# Patient Record
Sex: MALE | Race: BLACK OR AFRICAN AMERICAN | NOT HISPANIC OR LATINO | Employment: UNEMPLOYED | URBAN - METROPOLITAN AREA
[De-identification: names, ages, dates, MRNs, and addresses within clinical notes are randomized per-mention and may not be internally consistent; named-entity substitution may affect disease eponyms.]

---

## 2018-06-27 ENCOUNTER — HOSPITAL ENCOUNTER (EMERGENCY)
Facility: CLINIC | Age: 33
Discharge: HOME | End: 2018-06-27
Attending: EMERGENCY MEDICINE | Admitting: EMERGENCY MEDICINE
Payer: COMMERCIAL

## 2018-06-27 VITALS
SYSTOLIC BLOOD PRESSURE: 112 MMHG | OXYGEN SATURATION: 98 % | HEART RATE: 90 BPM | TEMPERATURE: 98.4 F | DIASTOLIC BLOOD PRESSURE: 53 MMHG

## 2018-06-27 DIAGNOSIS — F10.920 ALCOHOLIC INTOXICATION WITHOUT COMPLICATION (H): ICD-10-CM

## 2018-06-27 LAB — ALCOHOL BREATH TEST: 0.12 (ref 0–0.01)

## 2018-06-27 PROCEDURE — 99282 EMERGENCY DEPT VISIT SF MDM: CPT

## 2018-06-27 NOTE — ED PROVIDER NOTES
"  History     Chief Complaint:  Alcohol intoxication    HPI   Olivia Simmons is a 33 year old male who presents with alcohol intoxication. Per nursing report, the patient wandered into [facility] department this evening and appeared highly intoxication with slurred speech and uncoordinated balance. The police placed him on a hold and brought him here for evaluation. Here, the patient notes he went to the police station because he \"wanted to go home to [facility].\" He also reports that he is homeless and endorses using marijuana. Per chart review, the patient has been seen multiple times recently at [facility] for alcohol intoxication. He denies suicidal ideas, homicidal ideas, or any additional complaints here.     Allergies:  No known drug allergies     Medications:    The patient is currently on no regular medications.     Past Medical History:    The patient does not have any past pertinent medical history.     Past Surgical History:    History reviewed. No pertinent surgical history.     Family History:    History reviewed. No pertinent family history.      Social History:  Smoking status: Everyday smoker  Alcohol use: Yes    Marital Status:  Single      Review of Systems   Unable to perform ROS: Mental status change   Psychiatric/Behavioral: Negative for suicidal ideas.     Physical Exam     Patient Vitals for the past 24 hrs:   BP Temp Temp src Heart Rate SpO2   06/27/18 0342 112/53 98.4  F (36.9  C) Oral 101 98 %   06/27/18 0314 - - - - 98 %   06/27/18 0313 - - - - 97 %   06/27/18 0312 102/67 - - - -      Physical Exam  Constitutional: Intoxicated   HENT:   Head: Atraumatic  Right Ear: Normal  Left Ear: Normal  Nose: Nose normal.   Mouth/Throat: Oropharynx is clear and moist. No erythema or exudate.   Eyes: Conjunctivae and EOM are normal. Pupils are equal, round, and reactive to light. No discharge  Neck: Normal range of motion. Neck supple.   Cardiovascular: Normal rate, regular rhythm, no murmur gallops or rubs. " Intact distal pulses.    Pulmonary/Chest: CTA bilaterally. No wheezes rale or rhonchi.  Abdominal: Soft. Non tender.  No masses   Musculoskeletal: No edema. No bony deformity. Normal range of motion  Lymphadenopathy:     The patient has no cervical adenopathy.   Neurological: The patient is alert and oriented to person, place, and time. The patient has normal strength and normal reflexes. No cranial nerve deficit. Coordination normal.   Skin: Skin is warm and dry. No rash noted. The patient is not diaphoretic.   Psychiatric: Intoxicated.     Emergency Department Course     Laboratory:  0317 Alcohol breath test: 0.117(A)     Emergency Department Course:  Past medical records, nursing notes, and vitals reviewed.  0316: I performed an exam of the patient and obtained history, as documented above.   Labs obtained as above.   0430: I rechecked the patient. Findings and plan explained to the Patient. Patient discharged home with instructions regarding supportive care, medications, and reasons to return. The importance of close follow-up was reviewed.     Patient was signed out to my partner Dr. Ceballos.      Impression & Plan      Medical Decision Making:  Olivia Simmons is a 33 year old male who presents with alcohol intoxication. He appearantly wandered in to the [place] [employer], appearing unsteady on his feet and was brought here for further evaluation. Patient denies any suicidal or homicidal ideation. He does tell the nursing staff that he is homeless and does want to return to [facility]. Breathalyzer alcohol was 0.117. Patient will be allowed to sober in the ED and can be discharged when the buses start running at 0500 or 0600 AM.     Diagnosis:    ICD-10-CM    1. Alcoholic intoxication without complication (H) F10.920        Disposition:  discharged to home    [facility] EMERGENCY DEPARTMENT    Ace MANDEL am serving as a scribe at 3:11 AM on 6/27/2018 to document services personally performed by Levon Lobo  MD [facility] based on my observations and the provider's statements to me.       Levon Lobo MD  06/27/18 4650

## 2018-06-27 NOTE — ED TRIAGE NOTES
Pt is pleasantly intoxicated.  He is happy and smiling asking to go back to [place].  He changed into the ED scrubs without assistance. MD at bedside.

## 2018-06-27 NOTE — ED AVS SNAPSHOT
Emergency Department    [address] [place] [address]    Phone:  [phone]    Fax:  [phone]                                       Olivia Simmons   MRN: [id number]    Department:   Emergency Department   Date of Visit:  6/27/2018           Patient Information     Date Of Birth          1985        Your diagnoses for this visit were:     Alcoholic intoxication without complication (H)        You were seen by Levon Lobo MD.      Follow-up Information     Follow up with your doctor.        Discharge Instructions         Alcohol Intoxication  Alcohol intoxication occurs when you drink alcohol faster than your liver can remove it from your system. The following facts are important to remember:    It can take 10 minutes or more to start to feel the effects of a drink, so you can easily get more intoxicated than you intended.    One drink may be more than 1 serving of alcohol. Depending on the drink, it can be 2 to 4 servings.    It takes about an hour for your body to metabolize (clear) 1 serving. If you have more than 1 drink, it can take a couple of hours or more.    Many things affect how drinks will affect you, including whether you ve eaten, how fast you drink, your size, how much you normally drink (or not), medicines you take, chronic diseases you have, and gender.  Signs and symptoms of alcohol poisoning  The following are signs and symptoms of alcohol poisoning:  Mild impairment    Reduced inhibitions    Slurred speech    Drowsiness    Decreased fine motor skills  Moderate impairment    Erratic behavior, aggression, depression    Impaired judgment    Confusion    Concentration difficulties    Coordination problems  Severe impairment    Vomiting    Seizures    Unconsciousness    Cold, clammy    Slow or irregular breathing    Hypothermia (low body temperature)    Coma  Health effects  Alcohol abuse causes health problems. Sometimes this can happen after only drinking a  " little.\" There is no set number of drinks or amount of alcohol that defines too much. The more you drink at one time, and the more frequently you drink determine both the short-term and long-term health effects. It affects all parts of your body and your health, including your:    Brain. Alcohol is a central nervous system depressant. It can damage parts of the brain that affect your balance, memory, thinking, and emotions. It can cause memory loss, blackouts, depression, agitation, sleep cycle changes, and seizures. These changes may or may not be reversible.    Heart and vascular system. Alcohol affects multiple areas. It can damage heart muscle causing cardiomyopathy, which is a weakening and stretching of the heart muscle. This can lead to trouble breathing, an irregular heartbeat, atrial fibrillation, leg swelling, and heart failure. It makes the blood vessels stiffen causing hypertension (high blood pressure). All of these problems increase your risk of having heart attacks or strokes.    Liver. Alcohol causes fat to build up in the liver, affecting its normal function. This increases the risk for hepatitis, leading to abdominal pain, appetite loss, jaundice, bleeding problems, liver fibrosis, and cirrhosis. This in turn can affect your ability to fight off infections, and can cause diabetes. The liver changes prevent it from removing toxins in your blood that can cause encephalopathy. Signs of this are confusion, altered level of consciousness, personality changes, memory loss, seizures, coma, and death.    Pancreas. Alcohol can cause inflammation of the pancreas, or pancreatitis. This can cause pain in your abdomen, fever, and diabetes.    Immune system. Alcohol weakens your immune system in a number of ways. It suppresses your immune system making it harder to fight off infections and colds. You will also have a higher risk of certain infections like pneumonia and tuberculosis.    Cancer risk. Alcohol " raises your risk of cancer of the mouth, esophagus, pharynx, larynx, liver, and breast.    Sexual function. Alcohol abuse can also lead to sexual problems.  Alcohol use during pregnancy may cause permanent damage to the growing baby.  Home care  The following guidelines will help you care for yourself at home:    Don't drink any more alcohol.    Don't drive until all effects of the alcohol have worn off.    Don't operate machinery that can cause injuries.    Get lots of rest over the next few days. Drink plenty of water and other non-alcoholic liquids. Try to eat regular meals.    If you have been drinking heavily on a daily basis, you may go through alcohol withdrawal. The usual symptoms last 3 to 4 days and may include nervousness, shakiness, nausea, sweating, sleeplessness, and can even cause seizures and a serious withdrawal symptom called delirium tremens, or DTs. During this time, it is best that you stay with family or friends who can help and support you. You can also admit yourself to a residential detox program. If your symptoms are severe (seizures, severe shakiness, confusion), contact your doctor or call an ambulance for help (see below).   Follow-up care  If alcohol is a problem in your life, these are some organizations that can help you:    Alcoholics Anonymous offers support through a self-help fellowship. There are no dues or fees. See the Yellow Pages and call for time and place of meetings. Find AA online at www.aa.org.    [employer] offers support to families of alcohol users. Contact 696-951-7410, or online at www.al-anoyadira.org.    National [place] on Alcoholism and Drug Dependence can be reached at 423-859-3151, or online at www.ncadd.org.    There are also inpatient and residential alcohol detox programs. Check the Internet or phonebook Yellow Pages under  Drug Abuse and Treatment Centers.   Call 911  Call 911 if any of these occur:    Trouble breathing or slow irregular breathing    Chest  pain    Sudden weakness on one side of your body or sudden trouble speaking    Heavy bleeding or vomiting blood    Very drowsy or trouble awakening    Fainting or loss of consciousness    Rapid heart rate    Seizure  When to seek medical advice  Call your healthcare provider right away if any of these occur:    Severe shakiness     Fever of 100.4 F (38 C) or higher, or as directed by your healthcare provider    Confusion or hallucinations (seeing, hearing, or feeling things that are not there)    Pain in your upper abdomen that gets worse    Repeated vomiting  Date Last Reviewed: 6/1/2016 2000-2017 [employer]. [address] [place] [address]. All rights reserved. This information is not intended as a substitute for professional medical care. Always follow your healthcare professional's instructions.          24 Hour Appointment Hotline       To make an appointment at any [facility], call [phone] ([phone]). If you don't have a family doctor or clinic, we will help you find one. [facility] clinics are conveniently located to serve the needs of you and your family.             Review of your medicines      Notice     You have not been prescribed any medications.            Procedures and tests performed during your visit     Alcohol breath test POCT      Orders Needing Specimen Collection     None      Pending Results     No orders found from 6/25/2018 to 6/28/2018.            Pending Culture Results     No orders found from 6/25/2018 to 6/28/2018.            Pending Results Instructions     If you had any lab results that were not finalized at the time of your Discharge, you can call the ED Lab Result RN at [phone]. You will be contacted by this team for any positive Lab results or changes in treatment. The nurses are available 7 days a week from 10A to 6:30P.  You can leave a message 24 hours per day and they will return your call.        Test Results From Your  Hospital Stay        6/27/2018  3:33 AM      Component Results     Component Value Ref Range & Units Status    Alcohol Breath Test 0.117 (A) 0.00 - 0.01 Corrected    difficult for pt to complete the test.                 Clinical Quality Measure: Blood Pressure Screening     Your blood pressure was checked while you were in the emergency department today. The last reading we obtained was  BP: 112/53 . Please read the guidelines below about what these numbers mean and what you should do about them.  If your systolic blood pressure (the top number) is less than 120 and your diastolic blood pressure (the bottom number) is less than 80, then your blood pressure is normal. There is nothing more that you need to do about it.  If your systolic blood pressure (the top number) is 120-139 or your diastolic blood pressure (the bottom number) is 80-89, your blood pressure may be higher than it should be. You should have your blood pressure rechecked within a year by a primary care provider.  If your systolic blood pressure (the top number) is 140 or greater or your diastolic blood pressure (the bottom number) is 90 or greater, you may have high blood pressure. High blood pressure is treatable, but if left untreated over time it can put you at risk for heart attack, stroke, or kidney failure. You should have your blood pressure rechecked by a primary care provider within the next 4 weeks.  If your provider in the emergency department today gave you specific instructions to follow-up with your doctor or provider even sooner than that, you should follow that instruction and not wait for up to 4 weeks for your follow-up visit.        Thank you for choosing [facility]       Thank you for choosing [facility] for your care. Our goal is always to provide you with excellent care. Hearing back from our patients is one way we can continue to improve our services. Please take a few minutes to complete the written survey that you may receive in  "the mail after you visit with us. Thank you!        [employer] Information     [email or web address] lets you send messages to your doctor, view your test results, renew your prescriptions, schedule appointments and more. To sign up, go to [email or web address] . Click on \"Log in\" on the left side of the screen, which will take you to the Welcome page. Then click on \"Sign up Now\" on the right side of the page.     You will be asked to enter the access code listed below, as well as some personal information. Please follow the directions to create your username and password.     Your access code is: [email or web address]  Expires: 2018  5:55 AM     Your access code will  in 90 days. If you need help or a new code, please call your [facility] clinic or [phone].        Care EveryWhere ID     This is your Care EveryWhere ID. This could be used by other organizations to access your [facility] medical records  [id number]        Equal Access to Services     ARTI CHERRY : Hadii aad ku hadasho Sosamson, waaxda luqadaha, qaybta kaalmada adekevinyatamia, daniela stroud . So [facility] [phone].    ATENCIÓN: Si habla español, tiene a steven disposición servicios gratuitos de asistencia lingüística. Llame al [phone].    We comply with applicable federal civil rights laws and [place] laws. We do not discriminate on the basis of race, color, national origin, age, disability, sex, sexual orientation, or gender identity.            After Visit Summary       This is your record. Keep this with you and show to your community pharmacist(s) and doctor(s) at your next visit.                  "

## 2018-06-27 NOTE — ED AVS SNAPSHOT
Emergency Department    [address] [place] [address]    Phone:  [phone]    Fax:  [phone]                                       Olivia Simmons   MRN: [id number]    Department:   Emergency Department   Date of Visit:  6/27/2018           After Visit Summary Signature Page     I have received my discharge instructions, and my questions have been answered. I have discussed any challenges I see with this plan with the nurse or doctor.    ..........................................................................................................................................  Patient/Patient Representative Signature      ..........................................................................................................................................  Patient Representative Print Name and Relationship to Patient    ..................................................               ................................................  Date                                            Time    ..........................................................................................................................................  Reviewed by Signature/Title    ...................................................              ..............................................  Date                                                            Time

## 2018-06-27 NOTE — DISCHARGE INSTRUCTIONS
"  Alcohol Intoxication  Alcohol intoxication occurs when you drink alcohol faster than your liver can remove it from your system. The following facts are important to remember:    It can take 10 minutes or more to start to feel the effects of a drink, so you can easily get more intoxicated than you intended.    One drink may be more than 1 serving of alcohol. Depending on the drink, it can be 2 to 4 servings.    It takes about an hour for your body to metabolize (clear) 1 serving. If you have more than 1 drink, it can take a couple of hours or more.    Many things affect how drinks will affect you, including whether you ve eaten, how fast you drink, your size, how much you normally drink (or not), medicines you take, chronic diseases you have, and gender.  Signs and symptoms of alcohol poisoning  The following are signs and symptoms of alcohol poisoning:  Mild impairment    Reduced inhibitions    Slurred speech    Drowsiness    Decreased fine motor skills  Moderate impairment    Erratic behavior, aggression, depression    Impaired judgment    Confusion    Concentration difficulties    Coordination problems  Severe impairment    Vomiting    Seizures    Unconsciousness    Cold, clammy    Slow or irregular breathing    Hypothermia (low body temperature)    Coma  Health effects  Alcohol abuse causes health problems. Sometimes this can happen after only drinking a  little.\" There is no set number of drinks or amount of alcohol that defines too much. The more you drink at one time, and the more frequently you drink determine both the short-term and long-term health effects. It affects all parts of your body and your health, including your:    Brain. Alcohol is a central nervous system depressant. It can damage parts of the brain that affect your balance, memory, thinking, and emotions. It can cause memory loss, blackouts, depression, agitation, sleep cycle changes, and seizures. These changes may or may not be " reversible.    Heart and vascular system. Alcohol affects multiple areas. It can damage heart muscle causing cardiomyopathy, which is a weakening and stretching of the heart muscle. This can lead to trouble breathing, an irregular heartbeat, atrial fibrillation, leg swelling, and heart failure. It makes the blood vessels stiffen causing hypertension (high blood pressure). All of these problems increase your risk of having heart attacks or strokes.    Liver. Alcohol causes fat to build up in the liver, affecting its normal function. This increases the risk for hepatitis, leading to abdominal pain, appetite loss, jaundice, bleeding problems, liver fibrosis, and cirrhosis. This in turn can affect your ability to fight off infections, and can cause diabetes. The liver changes prevent it from removing toxins in your blood that can cause encephalopathy. Signs of this are confusion, altered level of consciousness, personality changes, memory loss, seizures, coma, and death.    Pancreas. Alcohol can cause inflammation of the pancreas, or pancreatitis. This can cause pain in your abdomen, fever, and diabetes.    Immune system. Alcohol weakens your immune system in a number of ways. It suppresses your immune system making it harder to fight off infections and colds. You will also have a higher risk of certain infections like pneumonia and tuberculosis.    Cancer risk. Alcohol raises your risk of cancer of the mouth, esophagus, pharynx, larynx, liver, and breast.    Sexual function. Alcohol abuse can also lead to sexual problems.  Alcohol use during pregnancy may cause permanent damage to the growing baby.  Home care  The following guidelines will help you care for yourself at home:    Don't drink any more alcohol.    Don't drive until all effects of the alcohol have worn off.    Don't operate machinery that can cause injuries.    Get lots of rest over the next few days. Drink plenty of water and other non-alcoholic liquids.  Try to eat regular meals.    If you have been drinking heavily on a daily basis, you may go through alcohol withdrawal. The usual symptoms last 3 to 4 days and may include nervousness, shakiness, nausea, sweating, sleeplessness, and can even cause seizures and a serious withdrawal symptom called delirium tremens, or DTs. During this time, it is best that you stay with family or friends who can help and support you. You can also admit yourself to a residential detox program. If your symptoms are severe (seizures, severe shakiness, confusion), contact your doctor or call an ambulance for help (see below).   Follow-up care  If alcohol is a problem in your life, these are some organizations that can help you:    Alcoholics Anonymous offers support through a self-help fellowship. There are no dues or fees. See the Yellow Pages and call for time and place of meetings. Find AA online at www.aa.org.    [employer] offers support to families of alcohol users. Contact 718-202-1460, or online at www.al-anoyadira.org.    National [place] on Alcoholism and Drug Dependence can be reached at 802-175-5230, or online at www.ncadd.org.    There are also inpatient and residential alcohol detox programs. Check the Internet or phonebook Yellow Pages under  Drug Abuse and Treatment Centers.   Call 911  Call 911 if any of these occur:    Trouble breathing or slow irregular breathing    Chest pain    Sudden weakness on one side of your body or sudden trouble speaking    Heavy bleeding or vomiting blood    Very drowsy or trouble awakening    Fainting or loss of consciousness    Rapid heart rate    Seizure  When to seek medical advice  Call your healthcare provider right away if any of these occur:    Severe shakiness     Fever of 100.4 F (38 C) or higher, or as directed by your healthcare provider    Confusion or hallucinations (seeing, hearing, or feeling things that are not there)    Pain in your upper abdomen that gets worse    Repeated  vomiting  Date Last Reviewed: 6/1/2016 2000-2017 [employer]. [address] [place] [address], [place] [address]. All rights reserved. This information is not intended as a substitute for professional medical care. Always follow your healthcare professional's instructions.

## 2018-06-27 NOTE — ED NOTES
"Hold placed by [place] [employer] reads:  \"Olivia voluntarily showed up a police department highly intoxicated.  Olivia has slurred speech, uncoordinated balance, and CHANTAL of 0.12 on a weak sample.  At this time Olivia cannot care for himself.\"   "

## 2018-10-03 ENCOUNTER — OFFICE VISIT (OUTPATIENT)
Dept: URGENT CARE | Facility: URGENT CARE | Age: 33
End: 2018-10-03
Payer: COMMERCIAL

## 2018-10-03 ENCOUNTER — RADIANT APPOINTMENT (OUTPATIENT)
Dept: GENERAL RADIOLOGY | Facility: CLINIC | Age: 33
End: 2018-10-03
Attending: PHYSICIAN ASSISTANT
Payer: COMMERCIAL

## 2018-10-03 VITALS
OXYGEN SATURATION: 98 % | WEIGHT: 163.1 LBS | SYSTOLIC BLOOD PRESSURE: 118 MMHG | DIASTOLIC BLOOD PRESSURE: 90 MMHG | RESPIRATION RATE: 16 BRPM | HEART RATE: 67 BPM

## 2018-10-03 DIAGNOSIS — S89.92XA KNEE INJURY, LEFT, INITIAL ENCOUNTER: ICD-10-CM

## 2018-10-03 DIAGNOSIS — S89.92XA KNEE INJURY, LEFT, INITIAL ENCOUNTER: Primary | ICD-10-CM

## 2018-10-03 PROCEDURE — 99213 OFFICE O/P EST LOW 20 MIN: CPT | Performed by: PHYSICIAN ASSISTANT

## 2018-10-03 PROCEDURE — 73562 X-RAY EXAM OF KNEE 3: CPT | Mod: LT

## 2018-10-03 RX ORDER — ACETAMINOPHEN 500 MG
1000 TABLET ORAL EVERY 6 HOURS PRN
Qty: 100 TABLET | Refills: 0 | Status: SHIPPED | OUTPATIENT
Start: 2018-10-03

## 2018-10-03 NOTE — MR AVS SNAPSHOT
After Visit Summary   10/3/2018    Olivia Simmons    MRN: [id number]           Patient Information     Date Of Birth          1985        Visit Information        Provider Department      10/3/2018 9:50 AM Ingrid Christianson PA-C [facility]        Today's Diagnoses     Knee injury, left, initial encounter    -  1      Care Instructions    (S89.92XA) Knee injury, left, initial encounter  (primary encounter diagnosis)  Comment:   Plan: XR Knee Left 3 Views, acetaminophen (TYLENOL)         500 MG tablet, order for DME, order for DME,         ORTHOPEDICS ADULT REFERRAL          Follow up with orthopedics within the next 3-5 days for further evaluation.  Knee immobilizer and crutches until follow up with Orthopedic clinic.  Tylenol as needed for pain.              Follow-ups after your visit        Additional Services     ORTHOPEDICS ADULT REFERRAL       Your provider has referred you to: [facility] [place] [phone]   [email or web address]    Please be aware that coverage of these services is subject to the terms and limitations of your health insurance plan.  Call member services at your health plan with any benefit or coverage questions.      Please bring the following to your appointment:    >>   Any x-rays, CTs or MRIs which have been performed.  Contact the facility where they were done to arrange for  prior to your scheduled appointment.    >>   List of current medications   >>   This referral request   >>   Any documents/labs given to you for this referral                  Who to contact     If you have questions or need follow up information about today's clinic visit or your schedule please contact [facility] directly at [phone].  Normal or non-critical lab and imaging results will be communicated to you by MyChart, letter or phone within 4 business days after the clinic has  "received the results. If you do not hear from us within 7 days, please contact the clinic through Actimo or phone. If you have a critical or abnormal lab result, we will notify you by phone as soon as possible.  Submit refill requests through Actimo or call your pharmacy and they will forward the refill request to us. Please allow 3 business days for your refill to be completed.          Additional Information About Your Visit        Actimo Information     Actimo lets you send messages to your doctor, view your test results, renew your prescriptions, schedule appointments and more. To sign up, go to [email or web address] . Click on \"Log in\" on the left side of the screen, which will take you to the Welcome page. Then click on \"Sign up Now\" on the right side of the page.     You will be asked to enter the access code listed below, as well as some personal information. Please follow the directions to create your username and password.     Your access code is: [email or web address]  Expires: 2019 12:03 PM     Your access code will  in 90 days. If you need help or a new code, please call your [place] clinic or [phone].        Care EveryWhere ID     This is your Care EveryWhere ID. This could be used by other organizations to access your [place] medical records  [id number]        Your Vitals Were     Pulse Respirations Pulse Oximetry             67 16 98%          Blood Pressure from Last 3 Encounters:   10/03/18 118/90   18 112/53    Weight from Last 3 Encounters:   10/03/18 163 lb 1.6 oz (74 kg)              We Performed the Following     ORTHOPEDICS ADULT REFERRAL          Today's Medication Changes          These changes are accurate as of 10/3/18 12:03 PM.  If you have any questions, ask your nurse or doctor.               Start taking these medicines.        Dose/Directions    acetaminophen 500 MG tablet   Commonly known as:  TYLENOL   Used for:  Knee injury, left, initial encounter   Started " by:  Ingrid Christianson PA-C        Dose:  1000 mg   Take 2 tablets (1,000 mg) by mouth every 6 hours as needed for mild pain   Quantity:  100 tablet   Refills:  0       order for DME   Used for:  Knee injury, left, initial encounter   Started by:  Ingrid Christianson PA-C        Equipment being ordered: crutches   Quantity:  1 Device   Refills:  0       order for DME   Used for:  Knee injury, left, initial encounter   Started by:  Ingrid Christianson PA-C        Equipment being ordered: knee immobilizer   Quantity:  1 Device   Refills:  0            Where to get your medicines      These medications were sent to [facility] [address] [address]     Phone:  [phone]     acetaminophen 500 MG tablet         Some of these will need a paper prescription and others can be bought over the counter.  Ask your nurse if you have questions.     Bring a paper prescription for each of these medications     order for DME    order for DME                Primary Care Provider Fax #    Physician No Ref-Primary [phone]       No address on file        Equal Access to Services     [facility]: Hadjg infante Sosamson, waaxda lumichel, qaybta kaalyenny aburto, daniela stroud . So [facility] [phone].    ATENCIÓN: Si habla español, tiene a steven disposición servicios gratuitos de asistencia lingüística. Armin al [phone].    We comply with applicable federal civil rights laws and [place] laws. We do not discriminate on the basis of race, color, national origin, age, disability, sex, sexual orientation, or gender identity.            Thank you!     Thank you for choosing [facility]  for your care. Our goal is always to provide you with excellent care. Hearing back from our patients is one way we can continue to improve our services. Please take a few minutes to complete the  written survey that you may receive in the mail after your visit with us. Thank you!             Your Updated Medication List - Protect others around you: Learn how to safely use, store and throw away your medicines at www.disposemymeds.org.          This list is accurate as of 10/3/18 12:03 PM.  Always use your most recent med list.                   Brand Name Dispense Instructions for use Diagnosis    acetaminophen 500 MG tablet    TYLENOL    100 tablet    Take 2 tablets (1,000 mg) by mouth every 6 hours as needed for mild pain    Knee injury, left, initial encounter       order for DME     1 Device    Equipment being ordered: crutches    Knee injury, left, initial encounter       order for DME     1 Device    Equipment being ordered: knee immobilizer    Knee injury, left, initial encounter

## 2018-10-03 NOTE — PATIENT INSTRUCTIONS
(S89.92XA) Knee injury, left, initial encounter  (primary encounter diagnosis)  Comment:   Plan: XR Knee Left 3 Views, acetaminophen (TYLENOL)         500 MG tablet, order for DME, order for DME,         ORTHOPEDICS ADULT REFERRAL          Follow up with orthopedics within the next 3-5 days for further evaluation.  Knee immobilizer and crutches until follow up with Orthopedic clinic.  Tylenol as needed for pain.

## 2018-10-03 NOTE — PROGRESS NOTES
SUBJECTIVE:  Chief Complaint   Patient presents with     Urgent Care     Pt states left knee twisted, swollen, pain  sxs 1x weeks      Olivia Simmons is a 33 year old male presents with a chief complaint of left knee pain since he twisted it a week ago while playing basketball.    Denies falling.    Denies any other trauma.     He is currently in detox and is here with his caregiver/attendant.      No past medical history on file.   Alcohol abuse    There is no problem list on file for this patient.    Social History   Substance Use Topics     Smoking status: Current Every Day Smoker     Smokeless tobacco: Never Used     Alcohol use Not on file       ROS:  CONSTITUTIONAL:NEGATIVE for fever, chills, change in weight  INTEGUMENTARY/SKIN: NEGATIVE for worrisome rashes, moles or lesions  ENT/MOUTH: NEGATIVE for ear, mouth and throat problems  RESP:NEGATIVE for significant cough or SOB  CV: NEGATIVE for chest pain, palpitations or peripheral edema  MUSCULOSKELETAL: as per HPI  NEURO: NEGATIVE for weakness, dizziness or paresthesias  Review of systems negative except as stated above.    EXAM:   /90  Pulse 67  Resp 16  Wt 163 lb 1.6 oz (74 kg)  SpO2 98%  Gen: healthy,alert,no distress  Extremity: left knee with joint line tenderness at medial aspect.  NO swelling.  No erythema.  Patella moves well without crepitus.    Joint is stable.     There is not compromise to the distal circulation.  GENERAL APPEARANCE: healthy, alert and no distress  SKIN: no suspicious lesions or rashes  NEURO: Normal strength and tone, sensory exam grossly normal, mentation intact and speech normal    X-RAY was done.    (S89.92XA) Knee injury, left, initial encounter  (primary encounter diagnosis)  Comment:   Plan: XR Knee Left 3 Views, acetaminophen (TYLENOL)         500 MG tablet, order for DME, order for DME,         ORTHOPEDICS ADULT REFERRAL          Follow up with orthopedics within the next 3-5 days for further evaluation.  Knee  immobilizer and crutches until follow up with Orthopedic clinic.  Tylenol as needed for pain.      Patient expresses understanding and agreement with the assessment and plan as above.

## 2021-08-14 ENCOUNTER — HOSPITAL ENCOUNTER (EMERGENCY)
Facility: CLINIC | Age: 36
Discharge: HOME | End: 2021-08-14
Attending: EMERGENCY MEDICINE | Admitting: EMERGENCY MEDICINE
Payer: COMMERCIAL

## 2021-08-14 VITALS
HEART RATE: 95 BPM | OXYGEN SATURATION: 97 % | TEMPERATURE: 97.3 F | DIASTOLIC BLOOD PRESSURE: 97 MMHG | RESPIRATION RATE: 16 BRPM | SYSTOLIC BLOOD PRESSURE: 140 MMHG

## 2021-08-14 DIAGNOSIS — F10.220 ALCOHOL DEPENDENCE WITH UNCOMPLICATED INTOXICATION (H): ICD-10-CM

## 2021-08-14 DIAGNOSIS — F10.10 ALCOHOL ABUSE: ICD-10-CM

## 2021-08-14 LAB — ALCOHOL BREATH TEST: 0.03 (ref 0–0.01)

## 2021-08-14 PROCEDURE — 99283 EMERGENCY DEPT VISIT LOW MDM: CPT

## 2021-08-14 PROCEDURE — 99282 EMERGENCY DEPT VISIT SF MDM: CPT | Performed by: EMERGENCY MEDICINE

## 2021-08-14 PROCEDURE — 82075 ASSAY OF BREATH ETHANOL: CPT

## 2021-08-14 ASSESSMENT — ENCOUNTER SYMPTOMS
ARTHRALGIAS: 0
FEVER: 0
COLOR CHANGE: 0
SHORTNESS OF BREATH: 0
HEADACHES: 0
NECK STIFFNESS: 0
EYE REDNESS: 0
DIFFICULTY URINATING: 0
ABDOMINAL PAIN: 0
CONFUSION: 0

## 2021-08-14 ASSESSMENT — LIFESTYLE VARIABLES: INTOXICATION: 1

## 2021-08-14 NOTE — ED PROVIDER NOTES
[facility] EMERGENCY DEPARTMENT ([facility])       8/14/21  History     Chief Complaint   Patient presents with     Alcohol Intoxication     Looking for detox drinks liter of vodka day      The history is provided by the patient and medical records.   Alcohol Intoxication  Associated symptoms: no abdominal pain, no confusion, no headaches and no shortness of breath      Olivia Simmons is a 36 year old otherwise healthy homeless male who presents to the Emergency Department for alcohol assessment, requesting admission for detox. Patient drinks one gallon of vodka per day, with his last drink last night. He states that he has been in treatment for detox previously.  He denies history of withdrawal seizures or DTs. When [facility] was suggested patient states that he was there yesterday and they do not accept him.  Upon further review, patient is on a hold by [facility] for 1 month where he cannot show up because he does not follow protocol.  Patient repeatedly leaves prior to discharge.       History reviewed. No pertinent past medical history.    History reviewed. No pertinent surgical history.    History reviewed. No pertinent family history.    Social History     Tobacco Use     Smoking status: Current Every Day Smoker     Smokeless tobacco: Never Used   Substance Use Topics     Alcohol use: Yes     Comment: 1 liter of vodka a day        No current facility-administered medications for this encounter.     Current Outpatient Medications   Medication     acetaminophen (TYLENOL) 500 MG tablet     order for DME     order for DME      No Known Allergies      I have reviewed the Medications, Allergies, Past Medical and Surgical History, and Social History in the Epic system.    Review of Systems   Constitutional: Negative for fever.   HENT: Negative for congestion.    Eyes: Negative for redness.   Respiratory: Negative for shortness of breath.    Cardiovascular: Negative for chest pain.    Gastrointestinal: Negative for abdominal pain.   Genitourinary: Negative for difficulty urinating.   Musculoskeletal: Negative for arthralgias and neck stiffness.   Skin: Negative for color change.   Neurological: Negative for headaches.   Psychiatric/Behavioral: Negative for confusion.   All other systems reviewed and are negative.    A complete review of systems was performed with pertinent positives and negatives noted in the HPI, and all other systems negative.    Physical Exam   BP: (!) 145/85  Pulse: 105  Temp: (!) 95.6  F (35.3  C)  Resp: 14  SpO2: 99 %      Physical Exam  Constitutional:       General: He is not in acute distress.     Appearance: He is not diaphoretic.   HENT:      Head: Atraumatic.   Eyes:      General: No scleral icterus.     Pupils: Pupils are equal, round, and reactive to light.   Cardiovascular:      Heart sounds: Normal heart sounds.   Pulmonary:      Effort: No respiratory distress.      Breath sounds: Normal breath sounds.   Abdominal:      General: Bowel sounds are normal.      Palpations: Abdomen is soft.      Tenderness: There is no abdominal tenderness.   Musculoskeletal:         General: No tenderness.   Skin:     General: Skin is warm.      Findings: No rash.         ED Course   9:47 AM  The patient was seen and examined by Bharath Holliday MD in Room ED12.          Procedures              Results for orders placed or performed during the hospital encounter of 08/14/21 (from the past 24 hour(s))   Alcohol breath test POCT   Result Value Ref Range    Alcohol Breath Test 0.031 (A) 0.00 - 0.01     Medications - No data to display          Assessments & Plan (with Medical Decision Making)   36-year-old male who presents requesting detox from alcohol.  Differential includes alcoholism, homelessness, malingering, mental illness.  Exam was grossly unremarkable.  Laboratories revealed alcohol level of 0.031.  Patient was allowed to sleep more than 7 hours in the emergency room.  He was  manifesting no withdrawal symptoms.  He will be discharged with instructions on alcohol avoidance.    I have reviewed the nursing notes.    I have reviewed the findings, diagnosis, plan and need for follow up with the patient.    New Prescriptions    No medications on file       Final diagnoses:   None       I, Mercedes Blancas, am serving as a trained medical scribe to document services personally performed by Bhraath Holliday MD based on the provider's statements to me on August 14, 2021.  This document has been checked and approved by the attending provider.    I, Bharath Holliday MD, was physically present and have reviewed and verified the accuracy of this note documented by Mercedes Blancas, medical scribe.      Bharath Holliday MD  8/14/2021   [facility] EMERGENCY DEPARTMENT     Bharath Holliday MD  08/14/21 8990

## 2021-08-14 NOTE — ED NOTES
Patient states that he has had throat and chest pain due to a cough. I discussed this with Dr. Holliday who is at the bedside speaking with the patient.

## 2021-12-14 ENCOUNTER — HOSPITAL ENCOUNTER (EMERGENCY)
Facility: CLINIC | Age: 36
Discharge: HOME | End: 2021-12-14
Attending: EMERGENCY MEDICINE | Admitting: EMERGENCY MEDICINE
Payer: COMMERCIAL

## 2021-12-14 VITALS
TEMPERATURE: 98.1 F | HEART RATE: 102 BPM | DIASTOLIC BLOOD PRESSURE: 73 MMHG | OXYGEN SATURATION: 98 % | SYSTOLIC BLOOD PRESSURE: 122 MMHG | RESPIRATION RATE: 16 BRPM

## 2021-12-14 DIAGNOSIS — F10.920 ALCOHOLIC INTOXICATION WITHOUT COMPLICATION (H): ICD-10-CM

## 2021-12-14 LAB
ALCOHOL BREATH TEST: 0.06 (ref 0–0.01)
BASOPHILS # BLD AUTO: 0 10E3/UL (ref 0–0.2)
BASOPHILS NFR BLD AUTO: 1 %
EOSINOPHIL # BLD AUTO: 0.2 10E3/UL (ref 0–0.7)
EOSINOPHIL NFR BLD AUTO: 3 %
ERYTHROCYTE [DISTWIDTH] IN BLOOD BY AUTOMATED COUNT: 14 % (ref 10–15)
FLUAV RNA SPEC QL NAA+PROBE: NEGATIVE
FLUBV RNA RESP QL NAA+PROBE: NEGATIVE
HCT VFR BLD AUTO: 43.9 % (ref 40–53)
HGB BLD-MCNC: 15.8 G/DL (ref 13.3–17.7)
IMM GRANULOCYTES # BLD: 0 10E3/UL
IMM GRANULOCYTES NFR BLD: 0 %
LYMPHOCYTES # BLD AUTO: 2.9 10E3/UL (ref 0.8–5.3)
LYMPHOCYTES NFR BLD AUTO: 49 %
MCH RBC QN AUTO: 31 PG (ref 26.5–33)
MCHC RBC AUTO-ENTMCNC: 36 G/DL (ref 31.5–36.5)
MCV RBC AUTO: 86 FL (ref 78–100)
MONOCYTES # BLD AUTO: 0.5 10E3/UL (ref 0–1.3)
MONOCYTES NFR BLD AUTO: 8 %
NEUTROPHILS # BLD AUTO: 2.4 10E3/UL (ref 1.6–8.3)
NEUTROPHILS NFR BLD AUTO: 39 %
NRBC # BLD AUTO: 0 10E3/UL
NRBC BLD AUTO-RTO: 0 /100
PLATELET # BLD AUTO: 241 10E3/UL (ref 150–450)
RBC # BLD AUTO: 5.09 10E6/UL (ref 4.4–5.9)
SARS-COV-2 RNA RESP QL NAA+PROBE: NEGATIVE
WBC # BLD AUTO: 6 10E3/UL (ref 4–11)

## 2021-12-14 PROCEDURE — 250N000011 HC RX IP 250 OP 636: Performed by: EMERGENCY MEDICINE

## 2021-12-14 PROCEDURE — C9803 HOPD COVID-19 SPEC COLLECT: HCPCS

## 2021-12-14 PROCEDURE — 82075 ASSAY OF BREATH ETHANOL: CPT

## 2021-12-14 PROCEDURE — 85025 COMPLETE CBC W/AUTO DIFF WBC: CPT | Performed by: EMERGENCY MEDICINE

## 2021-12-14 PROCEDURE — 99283 EMERGENCY DEPT VISIT LOW MDM: CPT

## 2021-12-14 PROCEDURE — 87636 SARSCOV2 & INF A&B AMP PRB: CPT | Performed by: EMERGENCY MEDICINE

## 2021-12-14 PROCEDURE — 250N000013 HC RX MED GY IP 250 OP 250 PS 637: Performed by: EMERGENCY MEDICINE

## 2021-12-14 PROCEDURE — 36415 COLL VENOUS BLD VENIPUNCTURE: CPT | Performed by: EMERGENCY MEDICINE

## 2021-12-14 RX ORDER — ONDANSETRON 4 MG/1
4 TABLET, ORALLY DISINTEGRATING ORAL ONCE
Status: COMPLETED | OUTPATIENT
Start: 2021-12-14 | End: 2021-12-14

## 2021-12-14 RX ORDER — IBUPROFEN 200 MG
400 TABLET ORAL ONCE
Status: COMPLETED | OUTPATIENT
Start: 2021-12-14 | End: 2021-12-14

## 2021-12-14 RX ADMIN — IBUPROFEN 400 MG: 200 TABLET, FILM COATED ORAL at 09:35

## 2021-12-14 RX ADMIN — ONDANSETRON 4 MG: 4 TABLET, ORALLY DISINTEGRATING ORAL at 10:31

## 2021-12-14 NOTE — ED PROVIDER NOTES
History     Chief Complaint:  Alcohol Intoxication       HPI   Olivia Simmons is a 36 year old male who who presents after being found to intoxicated at hotel.  He tells us he has been drinking all day, denies any trauma.  He tells us that he was told by the place that he had to go to detox.  He is unsure why they brought him here, he has no medical complaints at this time.    Allergies:  No Known Allergies     Medications:    acetaminophen (TYLENOL) 500 MG tablet  order for DME  order for DME        Past Medical History:    No past medical history on file.    There are no problems to display for this patient.       Past Surgical History:    No past surgical history on file.     Family History:    family history is not on file.    Social History:   reports that he has been smoking. He has never used smokeless tobacco. He reports current alcohol use. He reports current drug use. Drug: Marijuana.    PCP: No Ref-Primary, Physician     Review of Systems   All other systems reviewed and are negative.        Physical Exam   Patient Vitals for the past 24 hrs:   BP Temp Temp src Pulse Resp SpO2   12/14/21 0447 117/66 98.3  F (36.8  C) Temporal 98 18 99 %        Physical Exam  Vitals: reviewed by me  General: Pt seen on [facility], pleasant, cooperative, and alert to conversation, quite intoxicated appearing however.  Eyes: Tracking well, clear conjunctiva BL  ENT: MMM, midline trachea.   Lungs: No tachypnea, no accessory muscle use. No respiratory distress.   CV: Rate as above  MSK: no joint effusion.  No evidence of trauma  Skin: No rash  Neuro: Slurred speech and no facial droop.  Psych: Not RIS, no e/o AH/VH    Emergency Department Course       Interventions:  Medications - No data to display     Emergency Department Course:  Past medical records, nursing notes, and vitals reviewed.  I performed an exam of the patient and obtained history, as documented above.    Impression & Plan      Medical Decision  Making:  This is a 36-year-old gentleman presents emergency room with alcohol intoxication.  He seems to be intoxicated, he endorses alcohol intoxication, and this is also the report that police given they dropped him off.  He is resting comfortably here, will allow for metabolization of alcohol, and signed out to oncoming provider.    Diagnosis:    ICD-10-CM    1. Alcoholic intoxication without complication (H)  F10.920         Discharge Medications:  New Prescriptions    No medications on file        12/14/2021   Anish Briceno*        Anish Briceno MD  12/14/21 0552

## 2021-12-14 NOTE — ED PROVIDER NOTES
Olivia Simmons is a 36 year old male who presents to the emergency department with alcohol intoxication.  He was found to be intoxicated at a hotel.  Upon reassessment patient reports that he is feeling improved.  He was given food to eat.    He was ambulated here and was steady.  He was given ibuprofen for headache as well as water to drink.    As he is steady with ambulation and no concerns for mental health concerns, he later requested to be discharged to detox.  There is a bed available at [facility] and he stated that he would be willing to stay there for 3 days.  He did note that he had one episode of hematemesis while here.  He is not on any blood thinners.  Suspect is related to gastritis and drinking.  Hemoglobin was checked and was found to be normal.  He is given a prescription for omeprazole.     Prieto Roa MD  12/14/21 2949

## 2022-06-25 ENCOUNTER — HOSPITAL ENCOUNTER (EMERGENCY)
Facility: CLINIC | Age: 37
Discharge: HOME | End: 2022-06-25
Attending: EMERGENCY MEDICINE | Admitting: EMERGENCY MEDICINE
Payer: COMMERCIAL

## 2022-06-25 VITALS
RESPIRATION RATE: 18 BRPM | TEMPERATURE: 98.3 F | HEART RATE: 80 BPM | WEIGHT: 164 LBS | HEIGHT: 71 IN | BODY MASS INDEX: 22.96 KG/M2 | OXYGEN SATURATION: 98 %

## 2022-06-25 DIAGNOSIS — F19.10 DRUG ABUSE (H): ICD-10-CM

## 2022-06-25 DIAGNOSIS — U07.1 INFECTION DUE TO 2019 NOVEL CORONAVIRUS: ICD-10-CM

## 2022-06-25 LAB — SARS-COV-2 RNA RESP QL NAA+PROBE: POSITIVE

## 2022-06-25 PROCEDURE — U0003 INFECTIOUS AGENT DETECTION BY NUCLEIC ACID (DNA OR RNA); SEVERE ACUTE RESPIRATORY SYNDROME CORONAVIRUS 2 (SARS-COV-2) (CORONAVIRUS DISEASE [COVID-19]), AMPLIFIED PROBE TECHNIQUE, MAKING USE OF HIGH THROUGHPUT TECHNOLOGIES AS DESCRIBED BY CMS-2020-01-R: HCPCS | Performed by: EMERGENCY MEDICINE

## 2022-06-25 PROCEDURE — C9803 HOPD COVID-19 SPEC COLLECT: HCPCS

## 2022-06-25 PROCEDURE — 99283 EMERGENCY DEPT VISIT LOW MDM: CPT

## 2022-06-25 NOTE — ED TRIAGE NOTES
[facility]  ED Arrival Note    Arrives through triage. ABC's intact. A &O X4. . Pt arrives with no clear complaint, however, he is noted to have manic behavior as evidenced by restlessness, scattered and tangential thoughts, and pacing. Patient endorses using alcohol, marihuana, and Fentanyl tonight. Denies methamphetamine use.       Visitors during triage: None      Triage Interventions: Direct rooming     Ambulatory: Yes    Meets Stroke Criteria?: No    Meets Trauma Criteria?: No    Directed to: /    Pronouns: he/him       Triage Assessment     Row Name 06/25/22 0146       Triage Assessment (Adult)    Airway WDL WDL       Respiratory WDL    Respiratory WDL WDL       Skin Circulation/Temperature WDL    Skin Circulation/Temperature WDL WDL       Cardiac WDL    Cardiac WDL WDL       Peripheral/Neurovascular WDL    Peripheral Neurovascular WDL WDL       Cognitive/Neuro/Behavioral WDL    Cognitive/Neuro/Behavioral WDL X;mood/behavior    Mood/Behavior anxious

## 2022-06-25 NOTE — ED PROVIDER NOTES
"  History   Chief Complaint:  Manic Behavior       History limited by: Altered mental status.      Olivia Simmons is a 37 year old male who presents alone for evaluation of manic behavior. The patient walked into triage alone. He reports having some feet swelling from walking everywhere. He is homeless and endorses alcohol use, fentanyl use, marijuana use and possible methamphetamine use tonight.    Review of Systems   Unable to perform ROS: Mental status change     Allergies:  The patient has no known allergies.     Medications:  Pepcid    Past Medical History:     Insomnia   GERD  TB    Past Surgical History:    I & D perirectal abscess    Social History:  The patient presents to the ED alone  He is currently homeless  He endorses marijuana use, fentanyl use and alcohol use     Physical Exam     Patient Vitals for the past 24 hrs:   Temp Temp src Pulse Resp SpO2 Height Weight   06/25/22 0145 98.3  F (36.8  C) Temporal 80 18 98 % 1.803 m (5' 11\") 74.4 kg (164 lb)       Physical Exam   General: Appears intoxicated   Head: No signs of trauma.   CV: Normal rate and regular rhythm.    Resp: Effort normal and breath sounds normal. No respiratory distress.   GI: Soft. There is no tenderness.  No rebound or guarding.  Normal bowel sounds.    MSK: Normal range of motion.   Neuro: The patient is alert and moving all extremities.  Skin: Skin is warm and dry. No rash noted.   Psych: Appears intoxicated, unable to provide reliable history.      Emergency Department Course   Laboratory:  Labs Ordered and Resulted from Time of ED Arrival to Time of ED Departure   COVID-19 VIRUS (CORONAVIRUS) BY PCR - Abnormal       Result Value    SARS CoV2 PCR Positive (*)         Emergency Department Course:             Reviewed:  I reviewed nursing notes, vitals, past medical history and Care Everywhere    Assessments:  0158 I obtained history and examined the patient as noted above.   0550 I rechecked the patient and explained findings. "     Interventions:  None     Disposition:  Patient was discharged to home    Impression & Plan   Medical Decision Making:  Olivia Simmons Is a 37-year-old gentleman who presents due to erratic behavior.  He does endorse both alcohol and drug use and on chart review he does have a history of this.  I did obtain a screening COVID test which did come back positive.  Patient vital signs are appropriate and he is not in respiratory distress.  He was monitored for a number of hours at which time his sensorium did clear and he did not have any further complaints.  Patient was ultimately discharged with recommendation to abstain from drug use and for supportive care for COVID    Diagnosis:    ICD-10-CM    1. Drug abuse (H)  F19.10    2. Infection due to 2019 novel coronavirus  U07.1        Discharge Medications:  None     Scribe Disclosure:  Conner MANDEL, am serving as a scribe at 1:54 AM on 6/25/2022 to document services personally performed by Naseem Ochoa MD based on my observations and the provider's statements to me.            Naseem Ochoa MD  06/25/22 0739

## 2022-06-26 ENCOUNTER — HOSPITAL ENCOUNTER (EMERGENCY)
Facility: CLINIC | Age: 37
Discharge: HOME | End: 2022-06-26
Attending: EMERGENCY MEDICINE | Admitting: EMERGENCY MEDICINE
Payer: COMMERCIAL

## 2022-06-26 VITALS
OXYGEN SATURATION: 98 % | HEART RATE: 99 BPM | RESPIRATION RATE: 14 BRPM | DIASTOLIC BLOOD PRESSURE: 83 MMHG | SYSTOLIC BLOOD PRESSURE: 125 MMHG | TEMPERATURE: 98.1 F

## 2022-06-26 DIAGNOSIS — R41.82 ALTERED MENTAL STATUS, UNSPECIFIED ALTERED MENTAL STATUS TYPE: ICD-10-CM

## 2022-06-26 DIAGNOSIS — R45.1 AGITATION: ICD-10-CM

## 2022-06-26 LAB
FLUAV RNA SPEC QL NAA+PROBE: NEGATIVE
FLUBV RNA RESP QL NAA+PROBE: NEGATIVE
RSV RNA SPEC NAA+PROBE: NEGATIVE
SARS-COV-2 RNA RESP QL NAA+PROBE: POSITIVE

## 2022-06-26 PROCEDURE — 93005 ELECTROCARDIOGRAM TRACING: CPT | Performed by: EMERGENCY MEDICINE

## 2022-06-26 PROCEDURE — 250N000011 HC RX IP 250 OP 636

## 2022-06-26 PROCEDURE — 87637 SARSCOV2&INF A&B&RSV AMP PRB: CPT | Performed by: EMERGENCY MEDICINE

## 2022-06-26 PROCEDURE — 99291 CRITICAL CARE FIRST HOUR: CPT | Performed by: EMERGENCY MEDICINE

## 2022-06-26 PROCEDURE — C9803 HOPD COVID-19 SPEC COLLECT: HCPCS | Performed by: EMERGENCY MEDICINE

## 2022-06-26 RX ORDER — HALOPERIDOL 5 MG/ML
INJECTION INTRAMUSCULAR
Status: COMPLETED
Start: 2022-06-26 | End: 2022-06-26

## 2022-06-26 RX ORDER — OLANZAPINE 10 MG/2ML
INJECTION, POWDER, FOR SOLUTION INTRAMUSCULAR
Status: COMPLETED
Start: 2022-06-26 | End: 2022-06-26

## 2022-06-26 RX ADMIN — HALOPERIDOL LACTATE 10 MG: 5 INJECTION, SOLUTION INTRAMUSCULAR at 11:15

## 2022-06-26 RX ADMIN — OLANZAPINE 10 MG: 10 INJECTION, POWDER, FOR SOLUTION INTRAMUSCULAR at 10:47

## 2022-06-26 NOTE — ED NOTES
"Pt is more calm, asking nurse to forgive him and he keeps repeating \"please forgive me, I don't hate you, okay, okay?\" \"I'm cool right, forgive me sister, okay.\"  "

## 2022-06-26 NOTE — ED TRIAGE NOTES
Triage Assessment     Row Name 06/26/22 1038       Triage Assessment (Adult)    Airway WDL WDL       Respiratory WDL    Respiratory WDL X;cough    Cough Frequency frequent    Cough Type dry       Skin Circulation/Temperature WDL    Skin Circulation/Temperature WDL WDL       Peripheral/Neurovascular WDL    Peripheral Neurovascular WDL WDL       Cognitive/Neuro/Behavioral WDL    Cognitive/Neuro/Behavioral WDL X;mood/behavior    Level of Consciousness alert    Orientation oriented x 4    Mood/Behavior agitated;anxious;hyperactive (agitated, impulsive);restless       Pupils (CN II)    Pupil PERRLA yes

## 2022-06-26 NOTE — ED NOTES
Pt increasingly agitated with nursing interventions or questions, Pt is attempting to get up and is yelling and using threatening gestures, throwing arms all over in air and swearing loudly at staff. Code 21 called and security present at bedside. Chest restraint placed.

## 2022-06-26 NOTE — ED PROVIDER NOTES
History   Chief Complaint:  Alcohol Intoxication       The history is provided by the police. The history is limited by the condition of the patient.      Olivia Simmons is a 37 year old male with history of acid reflux who presents with alcohol intoxication. Per police, the patient was found intoxicated at a hotel and was acting aggressive. He was brought in for evaluation.    Review of Systems   Unable to perform ROS: Mental status change     Allergies:  No known allergies    Medications:  Famotidine    Past Medical History:     Acid reflux    Past Surgical History:    Rectal surgery    Family History:    No family history on file.    Social History:  The patient presents to the ED alone. He arrived to the ED via police escort.    Physical Exam     Patient Vitals for the past 24 hrs:   BP Temp Temp src Pulse Resp SpO2   06/26/22 1330 117/85 -- -- 88 -- 98 %   06/26/22 1300 117/84 -- -- 85 -- 98 %   06/26/22 1250 -- -- -- 78 -- 98 %   06/26/22 1230 110/78 -- -- 88 -- 98 %   06/26/22 1200 122/82 -- -- 117 -- 100 %   06/26/22 1130 131/89 -- -- 87 -- 99 %   06/26/22 1100 122/87 -- -- 97 14 99 %   06/26/22 1040 109/73 -- -- 99 -- 98 %   06/26/22 1035 109/67 98.1  F (36.7  C) Oral 105 16 100 %     Physical Exam  VS: Reviewed per above  HENT: Mucous membranes moist.  No external signs head trauma.  EYES: sclera anicteric  CV: Rate as noted,  regular rhythm.   RESP: Effort normal. Breath sounds are normal bilaterally.  GI: no tenderness/rebound/guarding, not distended.  NEURO: Alert, moving all extremities, psychomotor agitation  MSK: No deformity of the extremities  SKIN: Warm and dry    Emergency Department Course   ECG  ECG taken at 1034, ECG read at 1035  Sinus tachycardia   Rate 108 bpm. IA interval 126 ms. QRS duration 80 ms. QT/QTc 354/474 ms. P-R-T axes 47 81 30.         Emergency Department Course:     Reviewed:  I reviewed nursing notes, vitals, past medical history and Care  Everywhere    Assessments:  1040 In person face to face assessment completed, including an evaluation of the patient's immediate reaction to the intervention, complete review of systems assessment, behavioral assessment and review/assessment of history, drugs and medications, recent labs, etc., and behavioral condition. The patient experienced: No adverse physical outcome from seclusion/restraint initiation. The intervention of restraint or seclusion needs to continue.  1144 I rechecked the patient and he is asleep.  1221 I rechecked the patient.      Interventions:  1047 Zyprexa 10 mg M  1115 Haldol 10 mg IM      Disposition:  Care of the patient was transferred to my colleague pending sober reassessment.     Impression & Plan     Medical Decision Making:  Patient presents to the ER for evaluation of altered mental state.  Patient found to be agitated and presumed to be intoxicated at a hotel and was brought to the hospital for medical evaluation.  On arrival vital signs are reassuring.  He is quite agitated and screaming at staff, attempting to get out of the gurney.  I was worried for patients and staff safety.  Patient was placed in restraints and given IM sedatives including Zyprexa and Haldol.  After these measures, patient's agitation improved.  At signout to my colleague, plan for repeat evaluation once mental status clears from presumed intoxication.      Diagnosis:    ICD-10-CM    1. Altered mental status, unspecified altered mental status type  R41.82    2. Agitation  R45.1        Discharge Medications:  New Prescriptions    No medications on file       Scribe Disclosure:  I, Dre Edwards, am serving as a scribe at 10:47 AM on 6/26/2022 to document services personally performed by Boy Gonzalez MD based on my observations and the provider's statements to me.        Boy Gonzalez MD  06/26/22 8719

## 2022-06-26 NOTE — ED NOTES
The patient was signed out to me by Dr. Gonzalez.  He has a history of substance abuse and alcohol abuse.  He was intoxicated when he first came in.  I did go and assess him at 1645.  The patient was able to tell me that he had been drinking was able to talk.  He did not believe the COVID test from yesterday to encourage him to isolate I will check a COVID swab today.  I do not think other labs would likely be helpful.  The patient he walked and was steady will be discharged.  He is not hypoxic hypotensive or complaining of shortness of breath     Delano Singh MD  06/26/22 0253

## 2022-06-27 LAB
ATRIAL RATE - MUSE: 108 BPM
DIASTOLIC BLOOD PRESSURE - MUSE: NORMAL MMHG
INTERPRETATION ECG - MUSE: NORMAL
P AXIS - MUSE: 47 DEGREES
PR INTERVAL - MUSE: 126 MS
QRS DURATION - MUSE: 80 MS
QT - MUSE: 354 MS
QTC - MUSE: 474 MS
R AXIS - MUSE: 81 DEGREES
SYSTOLIC BLOOD PRESSURE - MUSE: NORMAL MMHG
T AXIS - MUSE: 30 DEGREES
VENTRICULAR RATE- MUSE: 108 BPM

## 2022-06-27 NOTE — ED NOTES
"Tried a few numbers for pt to find ride.  Only working number was sister, Jessica.  She refused to come pick him up, \"just discharge him to the street!\"  MD updated. Will continue to monitor pt.   "

## 2022-12-02 PROCEDURE — 99283 EMERGENCY DEPT VISIT LOW MDM: CPT

## 2022-12-03 ENCOUNTER — HOSPITAL ENCOUNTER (EMERGENCY)
Facility: CLINIC | Age: 37
Discharge: HOME | End: 2022-12-03
Attending: EMERGENCY MEDICINE | Admitting: EMERGENCY MEDICINE
Payer: COMMERCIAL

## 2022-12-03 VITALS
HEIGHT: 71 IN | SYSTOLIC BLOOD PRESSURE: 137 MMHG | RESPIRATION RATE: 16 BRPM | HEART RATE: 98 BPM | BODY MASS INDEX: 25.2 KG/M2 | WEIGHT: 180 LBS | DIASTOLIC BLOOD PRESSURE: 87 MMHG | TEMPERATURE: 98.1 F | OXYGEN SATURATION: 98 %

## 2022-12-03 DIAGNOSIS — F10.10 ALCOHOL ABUSE: ICD-10-CM

## 2022-12-03 DIAGNOSIS — Z59.00 HOMELESSNESS: ICD-10-CM

## 2022-12-03 SDOH — ECONOMIC STABILITY - HOUSING INSECURITY: HOMELESSNESS UNSPECIFIED: Z59.00

## 2022-12-03 NOTE — DISCHARGE INSTRUCTIONS
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
## 2023-07-10 ENCOUNTER — HOSPITAL ENCOUNTER (EMERGENCY)
Facility: CLINIC | Age: 38
Discharge: HOME | End: 2023-07-10
Attending: EMERGENCY MEDICINE | Admitting: EMERGENCY MEDICINE
Payer: COMMERCIAL

## 2023-07-10 ENCOUNTER — APPOINTMENT (OUTPATIENT)
Dept: GENERAL RADIOLOGY | Facility: CLINIC | Age: 38
End: 2023-07-10
Attending: EMERGENCY MEDICINE
Payer: COMMERCIAL

## 2023-07-10 VITALS
TEMPERATURE: 98.2 F | HEART RATE: 107 BPM | DIASTOLIC BLOOD PRESSURE: 65 MMHG | RESPIRATION RATE: 14 BRPM | SYSTOLIC BLOOD PRESSURE: 114 MMHG | OXYGEN SATURATION: 100 %

## 2023-07-10 DIAGNOSIS — M79.5 FOREIGN BODY (FB) IN SOFT TISSUE: ICD-10-CM

## 2023-07-10 DIAGNOSIS — F10.920 ALCOHOLIC INTOXICATION WITHOUT COMPLICATION (H): ICD-10-CM

## 2023-07-10 LAB
ALBUMIN SERPL BCG-MCNC: 3.6 G/DL (ref 3.5–5.2)
ALP SERPL-CCNC: 64 U/L (ref 40–129)
ALT SERPL W P-5'-P-CCNC: 24 U/L (ref 0–70)
ANION GAP SERPL CALCULATED.3IONS-SCNC: 16 MMOL/L (ref 7–15)
AST SERPL W P-5'-P-CCNC: 28 U/L (ref 0–45)
BASOPHILS # BLD AUTO: 0.1 10E3/UL (ref 0–0.2)
BASOPHILS NFR BLD AUTO: 1 %
BILIRUB SERPL-MCNC: 0.5 MG/DL
BUN SERPL-MCNC: 10.5 MG/DL (ref 6–20)
CALCIUM SERPL-MCNC: 8.3 MG/DL (ref 8.6–10)
CHLORIDE SERPL-SCNC: 103 MMOL/L (ref 98–107)
CREAT SERPL-MCNC: 0.94 MG/DL (ref 0.67–1.17)
DEPRECATED HCO3 PLAS-SCNC: 20 MMOL/L (ref 22–29)
EOSINOPHIL # BLD AUTO: 0.1 10E3/UL (ref 0–0.7)
EOSINOPHIL NFR BLD AUTO: 2 %
ERYTHROCYTE [DISTWIDTH] IN BLOOD BY AUTOMATED COUNT: 13.4 % (ref 10–15)
ETHANOL SERPL-MCNC: 0.16 G/DL
GFR SERPL CREATININE-BSD FRML MDRD: >90 ML/MIN/1.73M2
GLUCOSE SERPL-MCNC: 134 MG/DL (ref 70–99)
HCT VFR BLD AUTO: 45.9 % (ref 40–53)
HGB BLD-MCNC: 16.8 G/DL (ref 13.3–17.7)
IMM GRANULOCYTES # BLD: 0 10E3/UL
IMM GRANULOCYTES NFR BLD: 0 %
LYMPHOCYTES # BLD AUTO: 2.4 10E3/UL (ref 0.8–5.3)
LYMPHOCYTES NFR BLD AUTO: 40 %
MCH RBC QN AUTO: 32.6 PG (ref 26.5–33)
MCHC RBC AUTO-ENTMCNC: 36.6 G/DL (ref 31.5–36.5)
MCV RBC AUTO: 89 FL (ref 78–100)
MONOCYTES # BLD AUTO: 0.3 10E3/UL (ref 0–1.3)
MONOCYTES NFR BLD AUTO: 6 %
NEUTROPHILS # BLD AUTO: 3.1 10E3/UL (ref 1.6–8.3)
NEUTROPHILS NFR BLD AUTO: 51 %
NRBC # BLD AUTO: 0 10E3/UL
NRBC BLD AUTO-RTO: 0 /100
PLATELET # BLD AUTO: 206 10E3/UL (ref 150–450)
POTASSIUM SERPL-SCNC: 3.1 MMOL/L (ref 3.4–5.3)
PROT SERPL-MCNC: 5.5 G/DL (ref 6.4–8.3)
RBC # BLD AUTO: 5.15 10E6/UL (ref 4.4–5.9)
SODIUM SERPL-SCNC: 139 MMOL/L (ref 136–145)
WBC # BLD AUTO: 6 10E3/UL (ref 4–11)

## 2023-07-10 PROCEDURE — 99284 EMERGENCY DEPT VISIT MOD MDM: CPT

## 2023-07-10 PROCEDURE — 73130 X-RAY EXAM OF HAND: CPT | Mod: RT

## 2023-07-10 PROCEDURE — 80053 COMPREHEN METABOLIC PANEL: CPT | Performed by: EMERGENCY MEDICINE

## 2023-07-10 PROCEDURE — 36415 COLL VENOUS BLD VENIPUNCTURE: CPT | Performed by: EMERGENCY MEDICINE

## 2023-07-10 PROCEDURE — 82077 ASSAY SPEC XCP UR&BREATH IA: CPT | Performed by: EMERGENCY MEDICINE

## 2023-07-10 PROCEDURE — 85025 COMPLETE CBC W/AUTO DIFF WBC: CPT | Performed by: EMERGENCY MEDICINE

## 2023-07-10 RX ORDER — POTASSIUM CHLORIDE 1500 MG/1
40 TABLET, EXTENDED RELEASE ORAL ONCE
Status: COMPLETED | OUTPATIENT
Start: 2023-07-10 | End: 2023-07-10

## 2023-07-10 ASSESSMENT — ACTIVITIES OF DAILY LIVING (ADL)
ADLS_ACUITY_SCORE: 35
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
## 2023-07-10 NOTE — ED PROVIDER NOTES
Received patient in signout awaiting sober discharge and reassessment.  Patient is awake, eating a courtesy meal, stable gait.  Denies suicidal or homicidal ideation.  Will provide bus tokens for discharge.  Clinically sober for discharge at this time with no mental health concerns.     Marbella Alberto MD  07/10/23 0946
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
## 2023-07-10 NOTE — DISCHARGE INSTRUCTIONS
Xray of your right hand shows possible foreign bodies - I don't see any foreign bodies when I look at your hand    Discharge Instructions  Alcohol Intoxication    You have been seen today with alcohol intoxication. This means that you have enough alcohol in your system to impair your ability to mentally and physically function, perhaps to the extent that you were unable to care for yourself.    Generally, every Emergency Department visit should have a follow-up clinic visit with either a primary or a specialty clinic/provider. Please follow-up as instructed by your emergency provider today.    You may have come to the Emergency Department because of your intoxication, or for another reason, such as because of an injury. No matter what the case is, this visit is a  red flag  regarding alcohol use, and you should consider whether your drinking pattern is a problem for you.     You may be at risk for alcohol-related problems if:    Men: you drink more than 14 drinks per week, or more than 4 drinks per occasion.    Women: you drink more than 7 drinks per week or more than 3 drinks per occasion.    You have black-outs.  You do things you regret while drinking.  You have legal problems because of drinking.  You have job problems because of drinking (you call in sick to work because of drinking).    CAGE Questions  Have you ever felt you should cut down on your drinking?  Have people annoyed you by criticizing your drinking?  Have you ever felt bad or guilty about your drinking?  Have you ever had a drink first thing in the morning to steady your nerves or get rid of a hangover (eye opener)?    If you answer yes to any of the CAGE questions, you may have a problem with alcohol.      Return to the Emergency Department if:  You become shaky or tremble when you try to stop drinking.   You have severe abdominal pain (belly pain).   You have a seizure or pass out.    You vomit (throw up) blood or have blood in your stool. This  may be bright red or it may look like black coffee grounds.  You become lightheaded or faint.      For further help, contact:   Your caregiver.    Alcoholics Anonymous (AA).    [facility] Intergroup: [phone]  [facility] Central Office: [phone]   A drug or alcohol rehabilitation program.    You can get information on alcohol resources and groups by calling the number 211 or 1-488.581.4857 on any phone.     Seek medical care if:  You have persistent vomiting.   You have persistent pain in any part of your body.    You do not feel better after a few days.    If you were given a prescription for medicine here today, be sure to read all of the information (including the package insert) that comes with your prescription.  This will include important information about the medicine, its side effects, and any warnings that you need to know about.  The pharmacist who fills the prescription can provide more information and answer questions you may have about the medicine.  If you have questions or concerns that the pharmacist cannot address, please call or return to the Emergency Department.   Remember that you can always come back to the Emergency Department if you are not able to see your regular doctor in the amount of time listed above, if you get any new symptoms, or if there is anything that worries you.

## 2023-08-12 ENCOUNTER — TELEPHONE (OUTPATIENT)
Dept: BEHAVIORAL HEALTH | Facility: CLINIC | Age: 38
End: 2023-08-12

## 2023-08-12 ENCOUNTER — HOSPITAL ENCOUNTER (EMERGENCY)
Facility: CLINIC | Age: 38
Discharge: HOME | End: 2023-08-12
Attending: STUDENT IN AN ORGANIZED HEALTH CARE EDUCATION/TRAINING PROGRAM | Admitting: STUDENT IN AN ORGANIZED HEALTH CARE EDUCATION/TRAINING PROGRAM
Payer: COMMERCIAL

## 2023-08-12 VITALS
WEIGHT: 144 LBS | TEMPERATURE: 97.1 F | DIASTOLIC BLOOD PRESSURE: 84 MMHG | RESPIRATION RATE: 14 BRPM | BODY MASS INDEX: 20.08 KG/M2 | OXYGEN SATURATION: 97 % | SYSTOLIC BLOOD PRESSURE: 125 MMHG | HEART RATE: 83 BPM

## 2023-08-12 DIAGNOSIS — M79.671 PAIN IN BOTH FEET: ICD-10-CM

## 2023-08-12 DIAGNOSIS — F15.10 METHAMPHETAMINE USE (H): ICD-10-CM

## 2023-08-12 DIAGNOSIS — G44.209 ACUTE NON INTRACTABLE TENSION-TYPE HEADACHE: ICD-10-CM

## 2023-08-12 DIAGNOSIS — Z59.00 HOMELESSNESS: ICD-10-CM

## 2023-08-12 DIAGNOSIS — M79.672 PAIN IN BOTH FEET: ICD-10-CM

## 2023-08-12 DIAGNOSIS — Z78.9 ALCOHOL USE: ICD-10-CM

## 2023-08-12 PROBLEM — F10.90 ALCOHOL USE: Status: ACTIVE | Noted: 2023-08-12

## 2023-08-12 LAB
ALCOHOL BREATH TEST: 0 (ref 0–0.01)
AMPHETAMINES UR QL SCN: ABNORMAL
BARBITURATES UR QL SCN: ABNORMAL
BENZODIAZ UR QL SCN: ABNORMAL
BZE UR QL SCN: ABNORMAL
CANNABINOIDS UR QL SCN: ABNORMAL
OPIATES UR QL SCN: ABNORMAL
PCP QUAL URINE (ROCHE): ABNORMAL

## 2023-08-12 PROCEDURE — 99283 EMERGENCY DEPT VISIT LOW MDM: CPT

## 2023-08-12 PROCEDURE — 250N000013 HC RX MED GY IP 250 OP 250 PS 637: Performed by: STUDENT IN AN ORGANIZED HEALTH CARE EDUCATION/TRAINING PROGRAM

## 2023-08-12 PROCEDURE — 80307 DRUG TEST PRSMV CHEM ANLYZR: CPT | Performed by: STUDENT IN AN ORGANIZED HEALTH CARE EDUCATION/TRAINING PROGRAM

## 2023-08-12 RX ORDER — IBUPROFEN 400 MG/1
400 TABLET, FILM COATED ORAL ONCE
Status: COMPLETED | OUTPATIENT
Start: 2023-08-12 | End: 2023-08-12

## 2023-08-12 RX ORDER — ACETAMINOPHEN 500 MG
1000 TABLET ORAL ONCE
Status: COMPLETED | OUTPATIENT
Start: 2023-08-12 | End: 2023-08-12

## 2023-08-12 RX ADMIN — ACETAMINOPHEN 1000 MG: 500 TABLET, FILM COATED ORAL at 07:29

## 2023-08-12 RX ADMIN — IBUPROFEN 400 MG: 400 TABLET ORAL at 07:29

## 2023-08-12 SDOH — ECONOMIC STABILITY - HOUSING INSECURITY: HOMELESSNESS UNSPECIFIED: Z59.00

## 2023-08-12 ASSESSMENT — ACTIVITIES OF DAILY LIVING (ADL)
ADLS_ACUITY_SCORE: 35
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
## 2023-08-12 NOTE — ED PROVIDER NOTES
History     Chief Complaint:  Foot Pain       HPI   Olivia Simmons is a 38 year old male presenting with bilateral foot pain.  On my evaluation, patient had already been in the emergency department for approximately 5 hours.  He tells me his bilateral foot pain has resolved at this time.  It occurs due to him spending the day walking.  He is currently un housed.  On my evaluation, the patient is complaining of headache, bilateral frontal, and is requesting detox.  He states he drinks alcohol, approximately a pint of whiskey per day.  He also uses methamphetamine.  He did use methamphetamine yesterday.  Regarding his headache, he does note an unusual headache for him.  He states he has not slept for the last 3 days.  No fever.  No vision changes.  No neck pain.  No neck stiffness.      Independent Historian:   None - Patient Only    Review of External Notes:   I personally reviewed notes from the patient's emergency department visits, numerous, [place]. [place] [facility] [place] me with information regarding patient's history of substance use.     Medications:    acetaminophen (TYLENOL) 500 MG tablet  order for DME  order for DME        Past Medical History:    History reviewed. No pertinent past medical history.    Past Surgical History:    History reviewed. No pertinent surgical history.     Physical Exam   Patient Vitals for the past 24 hrs:   BP Temp Temp src Pulse Resp SpO2 Weight   08/12/23 1129 125/84 -- -- 83 -- -- --   08/12/23 0146 112/75 97.1  F (36.2  C) Temporal 74 14 97 % 65.3 kg (144 lb)        Physical Exam  Vitals reviewed.   Constitutional:       General: He is not in acute distress.     Appearance: Normal appearance. He is not ill-appearing.   HENT:      Mouth/Throat:      Mouth: Mucous membranes are moist.   Eyes:      Extraocular Movements: Extraocular movements intact.      Pupils: Pupils are equal, round, and reactive to light.   Cardiovascular:      Rate and Rhythm: Normal rate and  regular rhythm.   Pulmonary:      Effort: Pulmonary effort is normal.      Breath sounds: Normal breath sounds.   Musculoskeletal:         General: No swelling, tenderness, deformity or signs of injury. Normal range of motion.   Skin:     General: Skin is warm and dry.   Neurological:      General: No focal deficit present.      Mental Status: He is alert and oriented to person, place, and time.      Cranial Nerves: No cranial nerve deficit.      Sensory: No sensory deficit.      Gait: Gait normal.           Emergency Department Course   Emergency Department Course & Assessments:         Interventions:  Medications   acetaminophen (TYLENOL) tablet 1,000 mg (1,000 mg Oral $Given 8/12/23 0729)   ibuprofen (ADVIL/MOTRIN) tablet 400 mg (400 mg Oral $Given 8/12/23 0729)        Assessments:       Independent Interpretation (X-rays, CTs, rhythm strip):  None    Consultations/Discussion of Management or Tests:  None     Social Determinants of Health affecting care:   Homelessness/Housing Insecurity    Disposition:  Discharge     Impression & Plan   CMS Diagnoses: None       Medical Decision Making:  Patient presenting with complaints of foot pain, which have resolved.  No work-up with labs or imaging indicated at this time. Significant history of substance use and homelessness.  Patient's headache has characteristics of tension type headache.  No red flag symptoms.  Plan to provide symptomatic management.  Patient was evaluated by inpatient detox.  He had no symptoms of alcohol withdrawal.  They stated he would not be eligible for inpatient admission.  Plan for discharge.          Critical Care time: was 0 minutes for this patient excluding procedures.       Diagnosis:    ICD-10-CM    1. Pain in both feet  M79.671     M79.672       2. Acute non intractable tension-type headache  G44.209       3. Alcohol use  Z78.9       4. Homelessness  Z59.00       5. Methamphetamine use (H)  F15.10            Discharge Medications:  New  Prescriptions    No medications on file        8/12/2023   MD Hermelindo GAR Samuel, MD  08/12/23 1228       Prasanna Casarez MD  08/12/23 1244       Prasanna Casarez MD  09/01/23 1308
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
## 2023-08-12 NOTE — TELEPHONE ENCOUNTER
S: [facility] ED , Provider RENETTA Faria calling at 10:58am with clinical on a 38 year old/Male presenting for alcohol detox.     B: Pt presents for ETOH detox.   Currently reports drinking 1 pint of Whiskey Daily for years.  Pt also endorses Meth use, last use was yesterday.  Patient reports last use was prior to ER arrival.  Pt CHANTAL: 0.0  Pt  N/A hx of DT  Pt  denies hx of seizures. Last seizure: N/A  Pt endorsing the following symptoms of withdrawal: Shaky and restless and agitated  MSSA Score: CIWA: 4 anxious, a little shakey.    Pt denies acute mental health or medical concerns.   Pt endorses other drug use: None and Meth use Amount/frequency:  pt reports uses occasionally, but did state last use was yesterday    Does Pt have a detox care plan in Epic? None  Does pt present with specific needs, assistive devices, or exclusionary criteria? None  Is the patient ambulating, eating and drinking in the ED? Yes    A: Pt meets criteria to be presented for IP detox admission. Patient is voluntary    COVID Symptoms: No  If yes, COVID test required   Utox: In process  CMP: WNL  CBC: WNL  HCG: N/A     R: Patient cleared and ready for behavioral bed placement: Yes    Pt is meeting criteria for presentation to 3A/CD      Intake Paged Dr Ortiz @ 11: 28am to present for 3A/CD Admit  Dr Bowen called Intake back @ 12:06pm and concerned re Pt CIWA sop  Low, not showing withdrawal symptoms and alcohol level is a Zero.   Asked Intake to call ED, have them monitor for withdrawal.  If withdrawls require MSSA Protocol, will reassess, otherwise not feeling pt is requiring IP Detox at this time.    Intake called [facility] ED @ 12:38pm and talked to RN.  RN also feels pt not exhibiting signs of withdrawal.  RN transferred intake to Dr Casarez.  Intake also updated .    Pt removed from Detox list.   Dr cooper as pt is NOT showing SxS needing IP Detox.

## 2023-09-14 ENCOUNTER — HOSPITAL ENCOUNTER (EMERGENCY)
Facility: CLINIC | Age: 38
Discharge: HOME | End: 2023-09-15
Attending: EMERGENCY MEDICINE | Admitting: EMERGENCY MEDICINE
Payer: COMMERCIAL

## 2023-09-14 VITALS
HEART RATE: 107 BPM | SYSTOLIC BLOOD PRESSURE: 131 MMHG | TEMPERATURE: 97 F | DIASTOLIC BLOOD PRESSURE: 72 MMHG | RESPIRATION RATE: 18 BRPM | OXYGEN SATURATION: 99 %

## 2023-09-14 DIAGNOSIS — F19.10 POLYSUBSTANCE ABUSE (H): ICD-10-CM

## 2023-09-14 PROCEDURE — 99282 EMERGENCY DEPT VISIT SF MDM: CPT

## 2023-09-15 ASSESSMENT — ACTIVITIES OF DAILY LIVING (ADL)
ADLS_ACUITY_SCORE: 35
ADLS_ACUITY_SCORE: 33
ADLS_ACUITY_SCORE: 35

## 2023-09-15 NOTE — ED NOTES
At shift change it was reported that [facility] has a bed for the patient and attempts x2 by previous RN to call and give report was done, the last report attempt was at 0230. [facility] told that RN they would call back for report.
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
## 2023-09-15 NOTE — ED NOTES
I called and spoke with staff regarding the room available for [facility].  I was told they do have a bed for him but that they cannot take him until after 0830 when their RN will call here and evaluate the pt via phone. I was assured that one of their nurses will call between [phone].
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
## 2023-09-15 NOTE — ED PROVIDER NOTES
Patient signed out to me pending possible transfer to detox    Patient appears clinically sober in the morning. Okay with bus token and discharge. [facility] but still no definite time frame so will discharge     Marbella Santana MD  09/15/23 0598

## 2023-09-24 PROCEDURE — 99283 EMERGENCY DEPT VISIT LOW MDM: CPT

## 2023-09-24 PROCEDURE — 99284 EMERGENCY DEPT VISIT MOD MDM: CPT | Mod: 25

## 2023-09-24 PROCEDURE — 29515 APPLICATION SHORT LEG SPLINT: CPT | Mod: RT

## 2023-09-25 ENCOUNTER — APPOINTMENT (OUTPATIENT)
Dept: GENERAL RADIOLOGY | Facility: CLINIC | Age: 38
End: 2023-09-25
Attending: EMERGENCY MEDICINE
Payer: COMMERCIAL

## 2023-09-25 ENCOUNTER — HOSPITAL ENCOUNTER (EMERGENCY)
Facility: CLINIC | Age: 38
Discharge: HOME | End: 2023-09-25
Attending: EMERGENCY MEDICINE | Admitting: EMERGENCY MEDICINE
Payer: COMMERCIAL

## 2023-09-25 ENCOUNTER — TELEPHONE (OUTPATIENT)
Dept: BEHAVIORAL HEALTH | Facility: CLINIC | Age: 38
End: 2023-09-25
Payer: COMMERCIAL

## 2023-09-25 VITALS
OXYGEN SATURATION: 100 % | TEMPERATURE: 97.5 F | DIASTOLIC BLOOD PRESSURE: 72 MMHG | HEART RATE: 97 BPM | RESPIRATION RATE: 18 BRPM | SYSTOLIC BLOOD PRESSURE: 119 MMHG

## 2023-09-25 DIAGNOSIS — S93.401A SPRAIN OF RIGHT ANKLE, UNSPECIFIED LIGAMENT, INITIAL ENCOUNTER: ICD-10-CM

## 2023-09-25 DIAGNOSIS — F19.20 ADDICTION TO DRUG (H): ICD-10-CM

## 2023-09-25 PROCEDURE — 73630 X-RAY EXAM OF FOOT: CPT | Mod: RT

## 2023-09-25 PROCEDURE — 73610 X-RAY EXAM OF ANKLE: CPT | Mod: RT

## 2023-09-25 PROCEDURE — 250N000013 HC RX MED GY IP 250 OP 250 PS 637: Performed by: EMERGENCY MEDICINE

## 2023-09-25 RX ORDER — IBUPROFEN 600 MG/1
600 TABLET, FILM COATED ORAL ONCE
Status: COMPLETED | OUTPATIENT
Start: 2023-09-25 | End: 2023-09-25

## 2023-09-25 RX ADMIN — IBUPROFEN 600 MG: 600 TABLET ORAL at 01:31

## 2023-09-25 NOTE — DISCHARGE INSTRUCTIONS
Discharge Instructions  Ankle Sprain    An ankle sprain is a stretching or tearing of a ligament around your ankle joint. In most cases, we recommend resting the ankle for about 3 days, followed by return to activity. Some severe sprains need longer periods of rest, or can require a cast or boot to immobilize them.    Generally, every Emergency Department visit should have a follow-up clinic visit with either a primary or a specialty clinic/provider. Please follow-up as instructed by your emergency provider today.    Return to the Emergency Department if:  Your pain is much worse, or if there is pain in a new area.  Your foot or leg becomes pale, cool, blue, or numb or tingling.  There is anything concerning to you about how your ankle looks.  Any splint or device is feeling too tight, causing pain, or rubbing into your skin.    Follow-up with your provider:  As recommended by your emergency provider.  If your ankle is not back to normal within about 1 week.  If you are involved in significant athletic activities.        Treatment:  Apply ice your injured area for 15 minutes at a time, at least 3 times a day for the first 1-2 days. Use a cloth between the ice bag and your skin to prevent frostbite.   Do not sleep with an ice pack or heating pad on, since this can cause burns or skin injury.  Raise the injured area above the level of your heart as much as possible in the first 1-2 days.  Pain medications -- You may take a pain medication such as Tylenol  (acetaminophen), Advil , Nuprin  (ibuprofen) or Aleve  (naproxen).  Splint. We often give a stirrup-shaped ankle splint to support your ankle and prevent it from turning again. Wear this all the time for the first 3-5 days, and then as directed by your provider.  Crutches. If you cannot put weight on the ankle without a lot of pain, we recommend crutches. You can put as much weight on the ankle as possible without severe pain.   Compression. An elastic bandage (Ace   wrap) can help with pain and swelling. Remove this at least twice a day, and leave it off for several hours if you develop swelling of the foot.   Exercises.  Movements, like rotating the foot in circles, should be started when swelling improves.   If you were given a prescription for medicine here today, be sure to read all of the information (including the package insert) that comes with your prescription.  This will include important information about the medicine, its side effects, and any warnings that you need to know about.  The pharmacist who fills the prescription can provide more information and answer questions you may have about the medicine.  If you have questions or concerns that the pharmacist cannot address, please call or return to the Emergency Department.  Remember that you can always come back to the Emergency Department if you are not able to see your regular provider in the amount of time listed above, if you get any new symptoms, or if there is anything that worries you.  Discharge Instructions  Alcohol Intoxication    You have been seen today with alcohol intoxication. This means that you have enough alcohol in your system to impair your ability to mentally and physically function, perhaps to the extent that you were unable to care for yourself.    Generally, every Emergency Department visit should have a follow-up clinic visit with either a primary or a specialty clinic/provider. Please follow-up as instructed by your emergency provider today.    You may have come to the Emergency Department because of your intoxication, or for another reason, such as because of an injury. No matter what the case is, this visit is a  red flag  regarding alcohol use, and you should consider whether your drinking pattern is a problem for you.     You may be at risk for alcohol-related problems if:    Men: you drink more than 14 drinks per week, or more than 4 drinks per occasion.    Women: you drink more than  7 drinks per week or more than 3 drinks per occasion.    You have black-outs.  You do things you regret while drinking.  You have legal problems because of drinking.  You have job problems because of drinking (you call in sick to work because of drinking).    CAGE Questions  Have you ever felt you should cut down on your drinking?  Have people annoyed you by criticizing your drinking?  Have you ever felt bad or guilty about your drinking?  Have you ever had a drink first thing in the morning to steady your nerves or get rid of a hangover (eye opener)?    If you answer yes to any of the CAGE questions, you may have a problem with alcohol.      Return to the Emergency Department if:  You become shaky or tremble when you try to stop drinking.   You have severe abdominal pain (belly pain).   You have a seizure or pass out.    You vomit (throw up) blood or have blood in your stool. This may be bright red or it may look like black coffee grounds.  You become lightheaded or faint.      For further help, contact:   Your caregiver.    Alcoholics Anonymous (AA).    [facility]: [phone]  [facility] Central Office: [phone]   A drug or alcohol rehabilitation program.    You can get information on alcohol resources and groups by calling the number 211 or 1-282.974.8554 on any phone.     Seek medical care if:  You have persistent vomiting.   You have persistent pain in any part of your body.    You do not feel better after a few days.    If you were given a prescription for medicine here today, be sure to read all of the information (including the package insert) that comes with your prescription.  This will include important information about the medicine, its side effects, and any warnings that you need to know about.  The pharmacist who fills the prescription can provide more information and answer questions you may have about the medicine.  If you have questions or concerns that  the pharmacist cannot address, please call or return to the Emergency Department.   Remember that you can always come back to the Emergency Department if you are not able to see your regular doctor in the amount of time listed above, if you get any new symptoms, or if there is anything that worries you.

## 2023-09-25 NOTE — ED PROVIDER NOTES
History     Chief Complaint:  Ankle Pain and Addiction Problem       The history is provided by the patient.      Olivia Simmons is a 38 year old male who presents with lower right extremity pain after an injury that occurred at night on 9/23. Patient reports being unable to ambulate. Alongside this, patient states that he has a alcohol and cocaine addiction. He notes that he drinks around a pint of alcohol everyday. Patient denies suicidal thoughts or hallucination. He states that he struggles with homelessness.    Independent Historian:    Patient only    Review of External Notes:  None      Medications:    acetaminophen (TYLENOL) 500 MG tablet  order for DME  order for DME        Past Medical History:    No past medical history on file.    Past Surgical History:    No past surgical history on file.       Physical Exam   Patient Vitals for the past 24 hrs:   BP Temp Temp src Pulse Resp SpO2   09/25/23 0018 119/72 97.5  F (36.4  C) Temporal 97 18 100 %        Physical Exam  Constitutional:  Oriented to person, place, and time. Clinically sober.  HENT:   Head:    Normocephalic.   Mouth/Throat:   Oropharynx is clear and moist.   Eyes:    EOM are normal. Pupils are equal, round, and reactive to light.   Neck:    Neck supple.   Cardiovascular:  Normal rate, regular rhythm and normal heart sounds.      Exam reveals no gallop and no friction rub.       No murmur heard.  Pulmonary/Chest:  Effort normal and breath sounds normal.      No respiratory distress. No wheezes. No rales.      No reproducible chest wall pain.  Abdominal:   Soft. No distension. No tenderness. No rebound and no guarding.   Musculoskeletal:  Normal range of motion. 2 + distal pulse. Right ankle tenderness, no deformity. No ecchymosis or swelling.  Neurological:   Alert and oriented to person, place, and time.           Moves all 4 extremities spontaneously. Denies suicidal ideation or hallucinations.     Skin:    No rash noted. No pallor.      Emergency Department Course     Imaging:  Foot  XR, G/E 3 views, right   Final Result   IMPRESSION: Normal joint spaces and alignment. No fracture. Ankle mortise is congruent.      Ankle XR, G/E 3 views, right   Final Result   IMPRESSION: Normal joint spaces and alignment. No fracture. Ankle mortise is congruent.        Report per radiology    Laboratory:  Labs Ordered and Resulted from Time of ED Arrival to Time of ED Departure - No data to display         Emergency Department Course & Assessments:             Interventions:  Medications - No data to display     Assessments:  Velcro splint was placed by ED tech to right ankle.  He is ambulate without difficulty.    Independent Interpretation (X-rays, CTs, rhythm strip):  X-ray right ankle and right foot shows no fracture interpreted by myself.    Consultations/Discussion of Management or Tests:  None       Social Determinants of Health affecting care:  Homelessness/Housing Insecurity     Disposition:  The patient was discharged to home.     Impression & Plan        Medical Decision Makin-year-old male came in for right ankle foot injury.  X-ray is negative for fracture likely sprain.  He is weightbearing was placed in Velcro splint told to weight-bear as tolerated he was requesting detox for his alcohol and cocaine abuse.  Breathalyzer here was less than 0.008 and he is clinically sober without signs of withdrawal.  [facility] there were no detox beds available and did offer him outpatient resources.  At this time is otherwise appropriate outpatient management told to follow his primary care doctor return for any worsening symptoms or concerns.    Diagnosis:    ICD-10-CM    1. Sprain of right ankle, unspecified ligament, initial encounter  S93.401A       2. Addiction to drug (H)  F19.20            Discharge Medications:  New Prescriptions    No medications on file            2023   Marciano Briceno MD Shapin, Ryan P, MD  23 [id number]

## 2023-09-25 NOTE — TELEPHONE ENCOUNTER
"S: [facility] ED , Provider Janak calling at 01:19 with clinical on a 38 year old/Male presenting for alcohol detox.     B: Pt presents for ETOH detox.   Currently reports drinking 1 pint of vodka in addition to beer daily for awhile. Pt has been seen in the ED for ETOH use multiple times.     Patient reports last use was \"today.\"  Pt BAC: 0.008  Pt  denies hx of DT  Pt  denies hx of seizures. Last seizure: N/A  Pt endorsing the following symptoms of withdrawal:  None at this time  MSSA/CIWA Score: Needs CIWA score    Pt denies acute mental health or medical concerns. Pt denies SI, HI and hallucinations. No aggression reported  Pt endorses other drug use: Cocaine and Amphetamines Amount/frequency:  daily    Does Pt have a detox care plan in [facility]?  No  Does pt present with specific needs, assistive devices, or exclusionary criteria? None  Is the patient ambulating, eating and drinking in the ED? Yes    A: Pt meets criteria to be presented for IP detox admission. Patient is voluntary    COVID Symptoms: No  If yes, COVID test required   Utox: Not ordered, intake to request lab  - Requested at time of initial call  CMP: Not ordered, intake requested lab  CBC: Not ordered, intake requested lab  HCG: N/A     R: Patient cleared and ready for behavioral bed placement: No - pending labwork    Pt is meeting criteria for presentation to 3A/CD - Pending CIWA score. Last drink was today, BAC 0.008, no current withdrawal sxs    Does Patient need a Transfer Center request created? Yes, writer completed Transfer Center request at: 02:02    PPS awaiting lab work/medical clearance and CIWA score/update on W/D SXS before consulting w/ on-call provider for detox    Received call from [facility] ED @ 02:32 reporting pt left the ED. Detox admission no longer needed. Pt removed from [facility] and PPS is no longer following   "
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
## 2024-02-28 ENCOUNTER — HOSPITAL ENCOUNTER (EMERGENCY)
Facility: CLINIC | Age: 39
Discharge: HOME | End: 2024-02-29
Attending: EMERGENCY MEDICINE | Admitting: EMERGENCY MEDICINE
Payer: COMMERCIAL

## 2024-02-28 DIAGNOSIS — F10.920 ALCOHOLIC INTOXICATION WITHOUT COMPLICATION (H): ICD-10-CM

## 2024-02-28 PROCEDURE — 99282 EMERGENCY DEPT VISIT SF MDM: CPT

## 2024-02-28 ASSESSMENT — COLUMBIA-SUICIDE SEVERITY RATING SCALE - C-SSRS: IS THE PATIENT NOT ABLE TO COMPLETE C-SSRS: UNABLE TO VERBALIZE

## 2024-02-29 VITALS
OXYGEN SATURATION: 98 % | TEMPERATURE: 98.4 F | RESPIRATION RATE: 20 BRPM | SYSTOLIC BLOOD PRESSURE: 134 MMHG | HEART RATE: 93 BPM | DIASTOLIC BLOOD PRESSURE: 84 MMHG

## 2024-02-29 ASSESSMENT — ACTIVITIES OF DAILY LIVING (ADL)
ADLS_ACUITY_SCORE: 35

## 2024-02-29 NOTE — ED NOTES
A few moments following previous encounter with patient, the patient was noticed punching his mattress. Patient was requested to cease his behavior. Patient expressed that he simply wanted to forgo detox and leave the hospital.

## 2024-02-29 NOTE — DISCHARGE INSTRUCTIONS
Detox Centers [facility]:    [facility]  [address], [place] [id number] [phone]    [facility] (alcohol only) - [address] [facility]  [phone]     [facility] (alcohol only) [phone]     [facility] Det[facility]- Use [facility] [phone]     [facility] only provides detox for alcohol and benzodiazepines - NOT opioids.     Substance Abuse Treatment Resources:  To access substance abuse treatment you must have an assessment completed within 30 days of starting any program. Information for this to be completed and to secure funding if you have medical assistance or no insurance can be found through your County's chemical health intake line. If you have private insurance, call the customer service number on the back of your insurance card to find an in-network substance abuse assessment. The ideal provider will be a treatment facility, licensed in the [facility].     For those with [place] Medicaid you will need a Rule 25 assessment: The following are phone numbers for each [facility]. Rule 25 assessments must be completed by your current county of residence. Once approved for funding you can connect with a facility that does Rule 25 assessment.     [facility] [phone]     [facility] [phone]     [facility] [phone]     [facility] [phone]     [facility] [phone]     [facility] [phone]     [facility] [phone]     [place] - [phone]     The following facilities also offer Rule 25/chemical health assessments:     [facility]  [phone]  Mon-Friday: [address] [facility], [address]  Saturday:  [address] [facility] [address]  M-F assessments (7a-1:45p); Saturday assessments (7:45-10:45a)     [facility]  [phone]  [address] [place], [place], [address]  *by appointment only M-W; walk ins available Fridays from 10-2.     [facility]  [phone] (phone consultation  available 24/7)  In-person Assessments: [address], [place] [address]  [address] [address] [address]  [address] [place] [address]  [address] [place] [address]     [facility]  [phone]  [address],  [place], [place], [address]  *walk in and appointments available by calling     [facility]  [phone] [address] [place], [place], [address]  *by appointment only M-Th      [facility]  [phone]  [address] [place], [place], [address]  *walk ins available M-F     [facility]  [phone]  [address] [place], [place], [address]  *available by appointments only     [facility]  [phone]  [address] [place], [place], [address]  *available by appointment only     [facility] ([facility])  [phone]  [address] [place] [address] [address], [place], [address]  *Walk in assessments also available M-F starting at 8 am.     [facility]  [phone]  [address] [place], [place], [address]  *available by appointments     [facility]  [phone]  [address] [place], [place], [address]  *walk in assessments available M-F starting at 7 am.     [facility]  [phone]  [facility]  [address], [place], [place], [address]  *Walk in assessments availble M-F starting at 8 am OR [phone]     [facility]  [address] , [place] [address]  *Walk in assessments available M-F starting at 8 am     [facility]  [phone]  [address] [place] [address]     [facility]  [phone]  [address] [place], [place], [address]  *available by appointment only     [facility]  [phone]  [facility] [place], [address]     [facility]  [phone]  [address] [place], [address]  *available by appointment     [facility]  [phone]  [address] [facility], [place], [address]  *walk ins available M-TH from 9-3     [facility]  [phone]  [address] [place], [address]     [facility]  [phone] [address] [place], [place] [phone] [address] [address]     [facility] (Does Rule 25 Assessments)  [email or web address]  [phone]  [address] [place] [address], [address], [place] [address]     [facility]  [email or web address] [email or web address]-services/drug-alcohol-treatment  [phone], [address], [place], [place] [id number] [phone], [address], [place], [place] [address] [phone], [address], [place] [address]  [phone], [address] [facility] , [address], ([facility], [place] [address] [phone], [address] [place] [address]     If you are intoxicated, you may be required to detox at a detox facility before starting treatment. The following are detox facilities that you can self present to. All detox facilities are able to help you complete an assessment/rule 25 prior to discharge if you choose:     [facility]: [address] [place], [place], [address]  [phone]     [facility]: [address] [place], [place], [address]  [phone]     [facility]: [address] [place], [place], [address] [phone]     [facility]: [address] [place], [place], [address] [phone]     [facility]: [address] [place], [place], [address] [phone]      Ways to help cope with sobriety:     Take prescribed medicines as scheduled     Keep follow-up appointments     Talk to others about your concerns     Get regular exercise     Practice deep breathing skills     Eat a healthy diet     Use community  resources, including hotline numbers, [facility] crisis and support meetings     Stay sober and avoid places/people/things associated with substance use     Maintain a daily schedule/routine     Get at least 7-8 hours of sleep per night     Create a list 10--20 healthy activities that you can do that are enjoyable and do not involve substance use     Create daily goals (approx. 1-4 goals) per day and work to achieve them throughout the day      [facility] ([facility]): [facility] connects people seeking recovery to resources that help foster and sustain long-term recovery. Whether you are seeking resources for treatment, transportation, housing, job training, education, health care or other pathways to recovery, [facility] is a great place to start. Phone: [phone]. www.[employer]Media (Great listing of all types of recovery and non-recovery related resources)     [facility]: https://www.[facility].org/     Alcoholics Anonymous: 5-800-ALCOHOL  HTTP://WWW.AA.ORG/  [facility] [place] (289-773-9232 or http://aaAmericanTowns.com.org)  [facility] [place] ([phone] or www.aastpaul.org)     Narcotics Anonymous: 713.393.4139  www.namincafegive.us.     [facility]: [address] [place] [address], [address], [place], [place]  [phone]  Drop-in Hours: Monday-Friday 9-11:30 am. By appointment at other times.  [facility] is a drop-in center on the east side [facility] that provides a safe space for individuals who are homeless and have a history of chemical use. Sobriety is not a requirement but drugs and alcohol are not allowed on the property.  Non-clients can access drop-in services such as Recovery and Harm Reduction Groups, referrals to case management, community activities, shower facilities, and a pool table. Individuals who are homeless and have chemical health needs may be eligible for enrollment into [facility]'s case management program. Clients and  work together to access benefits,  treatment, health care, shelter, and external housing resources.     [facility] & Referral [facility]: [address] [place] [address]  [phone]  Monday-Friday 8 am-5 pm  Rule 25 assessment and referral for individuals seeking treatment or counseling for chemical dependency. Must be a resident of [facility]. There is no fee for assessment. There is some funding available for treatment programs.     [facility]: [address] [facility]  [phone] or [phone]  Monday - Friday 7 am - 5 pm  Daily drop-in Rule 25 assessments and weekly mental health assessments and services. Outpatient chemical dependency treatment (with sober recovery housing), relapse prevention, case management, and employment services. Outpatient and residential treatment for women with children. Specializes in assisting individuals with a history of relapse who face multiple barriers to achieving stable recovery. No charge for most services or services can be billed through insurance. Gender-specific services and treatment for co-occurring substance abuse and mental health concerns offered.

## 2024-02-29 NOTE — ED NOTES
"Writer answered call light, and patient proceeded to point at his right lower extremity, proximal to the ankle. Writer asked patient to describe what it is that he's attempting to identify or point to. Patient continued to insist that the writer should touch an apparent swelling present at the region. Writer asked the patient what the patient would like to do about his right ankle, and patient answered with the following: \"Just don't worry about it. I just want to go to detox, don't worry about the ankle.\"   "

## 2024-02-29 NOTE — ED TRIAGE NOTES
Brought by PD from suites across the street with c/o alcohol intoxication. Patient is not on hold per pd. Patient is awake and on room air. Patient is stating he is alcoholic.      Triage Assessment (Adult)       Row Name 02/28/24 6401          Triage Assessment    Airway WDL WDL        Respiratory WDL    Respiratory WDL WDL        Skin Circulation/Temperature WDL    Skin Circulation/Temperature WDL WDL        Cardiac WDL    Cardiac WDL WDL        Peripheral/Neurovascular WDL    Peripheral Neurovascular WDL WDL        Cognitive/Neuro/Behavioral WDL    Cognitive/Neuro/Behavioral WDL X;orientation
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
## 2024-03-03 ENCOUNTER — HOSPITAL ENCOUNTER (EMERGENCY)
Facility: CLINIC | Age: 39
Discharge: HOME | End: 2024-03-03
Attending: FAMILY MEDICINE | Admitting: FAMILY MEDICINE
Payer: COMMERCIAL

## 2024-03-03 VITALS
BODY MASS INDEX: 24.38 KG/M2 | HEIGHT: 72 IN | TEMPERATURE: 97.8 F | DIASTOLIC BLOOD PRESSURE: 84 MMHG | WEIGHT: 180 LBS | OXYGEN SATURATION: 97 % | HEART RATE: 99 BPM | SYSTOLIC BLOOD PRESSURE: 119 MMHG | RESPIRATION RATE: 16 BRPM

## 2024-03-03 DIAGNOSIS — S40.211A ABRASION OF RIGHT SHOULDER, INITIAL ENCOUNTER: ICD-10-CM

## 2024-03-03 PROCEDURE — 99283 EMERGENCY DEPT VISIT LOW MDM: CPT | Performed by: FAMILY MEDICINE

## 2024-03-03 RX ORDER — IBUPROFEN 800 MG/1
800 TABLET, FILM COATED ORAL EVERY 8 HOURS PRN
Qty: 15 TABLET | Refills: 0 | Status: SHIPPED | OUTPATIENT
Start: 2024-03-03 | End: 2024-03-08

## 2024-03-03 ASSESSMENT — COLUMBIA-SUICIDE SEVERITY RATING SCALE - C-SSRS
6. HAVE YOU EVER DONE ANYTHING, STARTED TO DO ANYTHING, OR PREPARED TO DO ANYTHING TO END YOUR LIFE?: NO
2. HAVE YOU ACTUALLY HAD ANY THOUGHTS OF KILLING YOURSELF IN THE PAST MONTH?: NO
1. IN THE PAST MONTH, HAVE YOU WISHED YOU WERE DEAD OR WISHED YOU COULD GO TO SLEEP AND NOT WAKE UP?: NO

## 2024-03-03 ASSESSMENT — ACTIVITIES OF DAILY LIVING (ADL): ADLS_ACUITY_SCORE: 35

## 2024-03-03 NOTE — DISCHARGE INSTRUCTIONS
Discharged to home with dressing in place use ibuprofen 3 times a day for discomfort and follow-up with primary MD

## 2024-03-03 NOTE — ED NOTES
At about 1219' pt stating he wants to leave, rotates his right arm/shoulder around stating he is better and is declining xray. Able to talk patient into going to xray as MD was with another patient. Pt left and quickly returned with transport personnel as he refused xray again. MD notified.

## 2024-03-03 NOTE — ED PROVIDER NOTES
ED Provider Note  [facility]      History     Chief Complaint   Patient presents with    Fall     GLF from standing.  Patient is at [address] [place] for detox from alcohol since Friday.  Fall Friday evening. Pain in both shoulders worse in right     HPI  Olivia Simmons is a 39 year old male with a history of polysubstance abuse (methamphetamine, alcohol, nicotine), currently at [address] [place] for detox, presents to the emergency department via EMS after a fall.  Patient notes that his only complaint at this time is his right shoulder with an abrasion on the right shoulder he is concerned that it may become infected and wanted it looked at.  In light of the fact the patient has a long history of alcohol use I discussed with him the possibility of head trauma he adamantly denies that he struck his head or had any loss of consciousness and states that he only injured his right shoulder.    Past Medical History  No past medical history on file.  No past surgical history on file.  ibuprofen (ADVIL/MOTRIN) 800 MG tablet  acetaminophen (TYLENOL) 500 MG tablet  order for DME  order for DME      No Known Allergies  Family History  No family history on file.  Social History   Social History     Tobacco Use    Smoking status: Every Day    Smokeless tobacco: Never   Substance Use Topics    Alcohol use: Yes     Comment: 1 liter of vodka a day     Drug use: Yes     Types: Marijuana         A medically appropriate review of systems was performed with pertinent positives and negatives noted in the HPI, and all other systems negative.    Physical Exam   BP: 126/76  Pulse: 104  Temp: 97.9  F (36.6  C)  Resp: 16  Height: 182.9 cm (6')  Weight: 81.6 kg (180 lb)  SpO2: 100 %  Physical Exam  Constitutional:       General: He is not in acute distress.     Appearance: Normal appearance. He is not toxic-appearing.   HENT:      Head: Atraumatic.   Eyes:      General: No scleral icterus.     Conjunctiva/sclera:  Conjunctivae normal.   Cardiovascular:      Rate and Rhythm: Normal rate.      Heart sounds: Normal heart sounds.   Pulmonary:      Effort: Pulmonary effort is normal. No respiratory distress.      Breath sounds: Normal breath sounds.   Abdominal:      Palpations: Abdomen is soft.      Tenderness: There is no abdominal tenderness.   Musculoskeletal:         General: No deformity.      Right shoulder: Tenderness present.      Cervical back: Neck supple.      Comments: Abrasion on the posterior aspect of the right shoulder no evidence of infection area was cleaned covered with bacitracin and a bandage.  Patient has tenderness with movement of the right shoulder however he is refusing x-ray at this time.   Skin:     General: Skin is warm.   Neurological:      Mental Status: He is alert.           ED Course, Procedures, & Data      Procedures       X-ray was ordered of the right shoulder with patient is refusing at this time.       Medications - No data to display  Labs Ordered and Resulted from Time of ED Arrival to Time of ED Departure - No data to display  No orders to display          Critical care was not performed.     Medical Decision Making  The patient's presentation was of low complexity (an acute and uncomplicated illness or injury).    The patient's evaluation involved:  ordering and/or review of 1 test(s) in this encounter (see separate area of note for details)    The patient's management necessitated moderate risk (prescription drug management including medications given in the ED).    Assessment & Plan        I have reviewed the nursing notes. I have reviewed the findings, diagnosis, plan and need for follow up with the patient.    Discharge Medication List as of 3/3/2024 12:33 PM        START taking these medications    Details   ibuprofen (ADVIL/MOTRIN) 800 MG tablet Take 1 tablet (800 mg) by mouth every 8 hours as needed for inflammatory pain, Disp-15 tablet, R-0, Local Print             Final  diagnoses:   Abrasion of right shoulder, initial encounter       Rolando LARSEN [facility] EMERGENCY DEPARTMENT  3/3/2024     Rolando Saunders MD  03/04/24 6241

## 2024-03-03 NOTE — ED NOTES
Cleaned abrasion on right posterior shoulder, applied bacitracin per MD and bandaged. No active bleeding

## 2024-03-06 ENCOUNTER — HOSPITAL ENCOUNTER (EMERGENCY)
Facility: CLINIC | Age: 39
Discharge: HOME | End: 2024-03-06
Attending: FAMILY MEDICINE | Admitting: FAMILY MEDICINE
Payer: COMMERCIAL

## 2024-03-06 VITALS
HEART RATE: 100 BPM | DIASTOLIC BLOOD PRESSURE: 85 MMHG | RESPIRATION RATE: 20 BRPM | OXYGEN SATURATION: 100 % | TEMPERATURE: 97.5 F | SYSTOLIC BLOOD PRESSURE: 130 MMHG

## 2024-03-06 DIAGNOSIS — F10.920 ALCOHOLIC INTOXICATION WITHOUT COMPLICATION (H): ICD-10-CM

## 2024-03-06 DIAGNOSIS — S40.211A ABRASION OF RIGHT SHOULDER, INITIAL ENCOUNTER: ICD-10-CM

## 2024-03-06 LAB — ALCOHOL BREATH TEST: 0.04 (ref 0–0.01)

## 2024-03-06 PROCEDURE — 82075 ASSAY OF BREATH ETHANOL: CPT | Performed by: FAMILY MEDICINE

## 2024-03-06 PROCEDURE — 99283 EMERGENCY DEPT VISIT LOW MDM: CPT | Performed by: FAMILY MEDICINE

## 2024-03-06 RX ORDER — ACETAMINOPHEN 325 MG/1
650 TABLET ORAL EVERY 4 HOURS PRN
Status: DISCONTINUED | OUTPATIENT
Start: 2024-03-06 | End: 2024-03-06 | Stop reason: HOSPADM

## 2024-03-06 ASSESSMENT — ACTIVITIES OF DAILY LIVING (ADL)
ADLS_ACUITY_SCORE: 35

## 2024-03-06 NOTE — ED PROVIDER NOTES
"ED Provider Note  [facility]      History     Chief Complaint   Patient presents with    Shoulder Pain     Right shoulder pain due to fall. Intoxicated. Found in a parking ramp. Trying to get to [facility].     HPI  Olivia Simmons is a 39 year old male who show alcohol abuse and dependence, polysubstance abuse, homelessness.  EMS was called by bystanders due to a man who was sleeping in a parking ramp.  He appeared intoxicated and was complaining of right shoulder pain.  On arrival here the patient tells me he is trying to get to detox.  He states he went there last night and they were full and so they told him \"go to [place] or come back in the morning\".  He was laying in the ramp and got cold and so when EMS arrived asked to be transported here.  He fell 3 days ago and injured his right shoulder.  He has an abrasion which was dressed that day and he has not changed the dressing.  He states this is still painful.  His ultimate goal is to [facility].  He states he has been drinking about a pint per day of vodka and occasionally smoking marijuana.  He denies any recent methamphetamine use or other serious drug use.  He denies any acute mental health symptoms.  Denies other medical symptoms.    Past Medical History  No past medical history on file.  No past surgical history on file.  acetaminophen (TYLENOL) 500 MG tablet  ibuprofen (ADVIL/MOTRIN) 800 MG tablet  order for DME  order for DME      No Known Allergies  Family History  No family history on file.  Social History   Social History     Tobacco Use    Smoking status: Every Day    Smokeless tobacco: Never   Substance Use Topics    Alcohol use: Yes     Comment: 1 liter of vodka a day     Drug use: Yes     Types: Marijuana         A medically appropriate review of systems was performed with pertinent positives and negatives noted in the HPI, and all other systems negative.    Physical Exam   BP: (!) 134/92  Pulse: " 97  Temp: 97.5  F (36.4  C)  Resp: 20  SpO2: 100 %  Physical Exam  Vitals and nursing note reviewed.   Constitutional:       General: He is not in acute distress.     Appearance: Normal appearance. He is not toxic-appearing.   HENT:      Head: Atraumatic.   Eyes:      General: No scleral icterus.     Conjunctiva/sclera: Conjunctivae normal.   Cardiovascular:      Rate and Rhythm: Normal rate.      Heart sounds: Normal heart sounds.   Pulmonary:      Effort: Pulmonary effort is normal. No respiratory distress.      Breath sounds: Normal breath sounds.   Abdominal:      Palpations: Abdomen is soft.      Tenderness: There is no abdominal tenderness.   Musculoskeletal:         General: No deformity.        Arms:       Cervical back: Neck supple.   Skin:     General: Skin is warm.   Neurological:      General: No focal deficit present.      Mental Status: He is alert and oriented to person, place, and time.      Cranial Nerves: Cranial nerves 2-12 are intact.      Sensory: Sensation is intact.      Motor: Motor function is intact.      Coordination: Coordination is intact.      Gait: Gait is intact.           ED Course, Procedures, & Data      Procedures               Results for orders placed or performed during the hospital encounter of 03/06/24   Alcohol breath test POCT     Status: Abnormal   Result Value Ref Range    Alcohol Breath Test 0.042 (A) 0.00 - 0.01     Medications   acetaminophen (TYLENOL) tablet 650 mg (has no administration in time range)     Labs Ordered and Resulted from Time of ED Arrival to Time of ED Departure   ALCOHOL BREATH TEST POCT - Abnormal       Result Value    Alcohol Breath Test 0.042 (*)      No orders to display          Critical care was not performed.     Medical Decision Making  The patient's presentation was of moderate complexity (a chronic illness mild to moderate exacerbation, progression, or side effect of treatment).    The patient's evaluation involved:  review of external  note(s) from 1 sources (reviewed recent prior ED visit)  ordering and/or review of 1 test(s) in this encounter (see separate area of note for details)    The patient's management necessitated only low risk treatment.    Assessment & Plan    Patient with a history of alcohol dependence and polysubstance abuse who was sleeping in a parking ramp and intoxicated with alcohol.  He complained of right shoulder pain where he has a healing abrasion which has been evaluated previously.  There is no sign of infection of the abrasion no signs of any other injury.  He denies any trauma.  He denies any acute psychiatric complaints and is not show any signs of acute alcohol withdrawal.  Is now clinically and legally sober.  He was allowed to sleep in the ED and was discharged with resources to seek outpatient detox and/or chemical dependency treatment should he desire.  We discussed the indications for emergency department return and follow-up.  Stable for discharge.]      I have reviewed the nursing notes. I have reviewed the findings, diagnosis, plan and need for follow up with the patient.    New Prescriptions    No medications on file       Final diagnoses:   Alcoholic intoxication without complication (H24)   Abrasion of right shoulder, initial encounter       Sony Reddy MD  [facility] EMERGENCY DEPARTMENT  3/6/2024     Sony Reddy MD  03/06/24 1022

## 2024-03-06 NOTE — DISCHARGE INSTRUCTIONS
To check the status of detox availability at [facility] call:  [phone]  To check the status of detox bed availability at [address] [place] [address] call:  [phone]  If you desire chemical dependency assessment or counseling, follow up with [facility]: [phone]

## 2024-04-24 ENCOUNTER — HOSPITAL ENCOUNTER (EMERGENCY)
Facility: CLINIC | Age: 39
Discharge: HOME | End: 2024-04-24
Attending: EMERGENCY MEDICINE | Admitting: EMERGENCY MEDICINE
Payer: COMMERCIAL

## 2024-04-24 VITALS
DIASTOLIC BLOOD PRESSURE: 74 MMHG | BODY MASS INDEX: 23.03 KG/M2 | SYSTOLIC BLOOD PRESSURE: 120 MMHG | TEMPERATURE: 97.6 F | RESPIRATION RATE: 16 BRPM | WEIGHT: 170 LBS | HEIGHT: 72 IN | OXYGEN SATURATION: 98 % | HEART RATE: 82 BPM

## 2024-04-24 DIAGNOSIS — F10.10 ALCOHOL ABUSE: ICD-10-CM

## 2024-04-24 LAB — ALCOHOL BREATH TEST: 0 (ref 0–0.01)

## 2024-04-24 PROCEDURE — 82075 ASSAY OF BREATH ETHANOL: CPT | Performed by: EMERGENCY MEDICINE

## 2024-04-24 PROCEDURE — 99283 EMERGENCY DEPT VISIT LOW MDM: CPT | Performed by: EMERGENCY MEDICINE

## 2024-04-24 PROCEDURE — 99282 EMERGENCY DEPT VISIT SF MDM: CPT | Performed by: EMERGENCY MEDICINE

## 2024-04-24 ASSESSMENT — COLUMBIA-SUICIDE SEVERITY RATING SCALE - C-SSRS
1. IN THE PAST MONTH, HAVE YOU WISHED YOU WERE DEAD OR WISHED YOU COULD GO TO SLEEP AND NOT WAKE UP?: NO
6. HAVE YOU EVER DONE ANYTHING, STARTED TO DO ANYTHING, OR PREPARED TO DO ANYTHING TO END YOUR LIFE?: NO
2. HAVE YOU ACTUALLY HAD ANY THOUGHTS OF KILLING YOURSELF IN THE PAST MONTH?: NO

## 2024-04-24 ASSESSMENT — ACTIVITIES OF DAILY LIVING (ADL)
ADLS_ACUITY_SCORE: 33

## 2024-04-24 NOTE — ED TRIAGE NOTES
Triage Assessment (Adult)       Row Name 04/24/24 0136          Triage Assessment    Airway WDL WDL        Respiratory WDL    Respiratory WDL WDL        Skin Circulation/Temperature WDL    Skin Circulation/Temperature WDL X  foot wound not yet seen        Cardiac WDL    Cardiac WDL WDL     Cardiac Rhythm NSR        Peripheral/Neurovascular WDL    Peripheral Neurovascular WDL WDL        Cognitive/Neuro/Behavioral WDL    Cognitive/Neuro/Behavioral WDL WDL

## 2024-04-24 NOTE — ED TRIAGE NOTES
Pt reports that he came in for detox and has been drinking 2 pints a day since 2015 last drink was at 0030. He also states that his feet hurt from walking for 2 days straight and that he has wounds on them.      Triage Assessment (Adult)       Row Name 04/24/24 0136          Triage Assessment    Airway WDL WDL        Respiratory WDL    Respiratory WDL WDL        Skin Circulation/Temperature WDL    Skin Circulation/Temperature WDL X  foot wound not yet seen        Cardiac WDL    Cardiac WDL WDL     Cardiac Rhythm NSR        Peripheral/Neurovascular WDL    Peripheral Neurovascular WDL WDL        Cognitive/Neuro/Behavioral WDL    Cognitive/Neuro/Behavioral WDL WDL

## 2024-04-24 NOTE — ED NOTES
Pt given discharge paperwork and instructions. No questions or concerns. VSS. A&O x4. Ambulatory with steady gait. Belongings returned to pt at time of discharge

## 2024-04-24 NOTE — ED PROVIDER NOTES
"    [facility] EMERGENCY DEPARTMENT ([facility])    4/24/24      ED PROVIDER NOTE    History     Chief Complaint   Patient presents with    Alcohol Problem     HPI  Olivia Simmons is a 39 year old male with PMH notable for polysubstance use disorder (alcohol, meth, nicotine), housing insecurity who presents to the Emergency Department with foot pain and seeking for detox.    Patient reports bilateral foot pain due to \"walking too much\" in the lat 2 days. He is homeless. He states coming from [facility] because \"they are full.\" He is interested in detox right now. He reports drinking 2 pints on daily basis. Last drink was 1 hour ago. He reports smoking cannabis. Otherwise, no other drug use. He denies other medical concerns.     Past Medical History  History reviewed. No pertinent past medical history.  History reviewed. No pertinent surgical history.  acetaminophen (TYLENOL) 500 MG tablet  order for DME  order for DME      No Known Allergies  Family History  History reviewed. No pertinent family history.  Social History   Social History     Tobacco Use    Smoking status: Every Day    Smokeless tobacco: Never   Substance Use Topics    Alcohol use: Yes     Comment: 2 pints a day    Drug use: Yes     Types: Marijuana         A complete review of systems was performed with pertinent positives and negatives noted in the HPI, and all other systems negative.    Physical Exam   BP: 125/83  Pulse: 91  Temp: 97.6  F (36.4  C)  Resp: 16  Height: 182.9 cm (6')  Weight: 77.1 kg (170 lb)  SpO2: 100 %  Physical Exam  Vitals reviewed.   Constitutional:       General: He is not in acute distress.     Appearance: He is not diaphoretic.   HENT:      Head: Normocephalic and atraumatic.      Right Ear: External ear normal.      Left Ear: External ear normal.      Nose: Nose normal. No rhinorrhea.      Mouth/Throat:      Mouth: Mucous membranes are moist.   Eyes:      General: No scleral icterus.     Extraocular Movements: " Extraocular movements intact.      Conjunctiva/sclera: Conjunctivae normal.   Cardiovascular:      Rate and Rhythm: Normal rate and regular rhythm.      Heart sounds: Normal heart sounds.   Pulmonary:      Effort: No respiratory distress.      Breath sounds: Normal breath sounds.   Abdominal:      General: Bowel sounds are normal.      Palpations: Abdomen is soft.      Tenderness: There is no abdominal tenderness.   Musculoskeletal:         General: No deformity. Normal range of motion.      Cervical back: Normal range of motion and neck supple.   Skin:     General: Skin is warm.      Findings: No rash.   Neurological:      Mental Status: Mental status is at baseline.   Psychiatric:         Mood and Affect: Mood normal.         Behavior: Behavior normal.           ED Course, Procedures, & Data      Procedures            Results for orders placed or performed during the hospital encounter of 04/24/24   Alcohol breath test POCT     Status: Normal   Result Value Ref Range    Alcohol Breath Test 0.000 0.00 - 0.01     Medications - No data to display  Labs Ordered and Resulted from Time of ED Arrival to Time of ED Departure   ALCOHOL BREATH TEST POCT - Normal       Result Value    Alcohol Breath Test 0.000       No orders to display        Medical Decision Making  The patient's presentation is strongly suggestive of low complexity (an acute and uncomplicated illness or injury).    The patient's evaluation involved:  review of external note(s) from 3+ sources (Most recent H&P in addition to clinic and ED note)  review of 2 test result(s) ordered prior to this encounter (Most recent BMP and CBC)    The patient's management involved only low risk treatment.    Assessment & Plan    39-year-old male presents to us with a chief complaint of requesting detox.  Patient's alcohol level is 0 today.  We do not have any beds available in detox.  He was observed for few hours and allowed to rest in the emergency department.  He did  not appear develop any withdrawal symptoms.  He was discharged.    I have reviewed the nursing notes. I have reviewed the findings, diagnosis, plan and need for follow up with the patient.    New Prescriptions    No medications on file       Final diagnoses:   Alcohol abuse       Reinaldo Palmer DO  [facility] EMERGENCY DEPARTMENT  4/24/2024        Reinaldo Palmer,   04/24/24 0631

## 2024-05-06 ENCOUNTER — HOSPITAL ENCOUNTER (EMERGENCY)
Facility: CLINIC | Age: 39
Discharge: HOME | End: 2024-05-06
Attending: EMERGENCY MEDICINE | Admitting: EMERGENCY MEDICINE
Payer: COMMERCIAL

## 2024-05-06 VITALS
SYSTOLIC BLOOD PRESSURE: 140 MMHG | OXYGEN SATURATION: 100 % | DIASTOLIC BLOOD PRESSURE: 77 MMHG | RESPIRATION RATE: 18 BRPM | HEART RATE: 96 BPM

## 2024-05-06 DIAGNOSIS — F10.929 ALCOHOLIC INTOXICATION WITH COMPLICATION (H): ICD-10-CM

## 2024-05-06 LAB
ANION GAP SERPL CALCULATED.3IONS-SCNC: 14 MMOL/L (ref 7–15)
ATRIAL RATE - MUSE: 93 BPM
BASOPHILS # BLD AUTO: 0 10E3/UL (ref 0–0.2)
BASOPHILS NFR BLD AUTO: 1 %
BUN SERPL-MCNC: 13.5 MG/DL (ref 6–20)
CALCIUM SERPL-MCNC: 8.8 MG/DL (ref 8.6–10)
CHLORIDE SERPL-SCNC: 97 MMOL/L (ref 98–107)
CREAT SERPL-MCNC: 0.83 MG/DL (ref 0.67–1.17)
DEPRECATED HCO3 PLAS-SCNC: 23 MMOL/L (ref 22–29)
DIASTOLIC BLOOD PRESSURE - MUSE: NORMAL MMHG
EGFRCR SERPLBLD CKD-EPI 2021: >90 ML/MIN/1.73M2
EOSINOPHIL # BLD AUTO: 0.1 10E3/UL (ref 0–0.7)
EOSINOPHIL NFR BLD AUTO: 2 %
ERYTHROCYTE [DISTWIDTH] IN BLOOD BY AUTOMATED COUNT: 12.8 % (ref 10–15)
GLUCOSE SERPL-MCNC: 94 MG/DL (ref 70–99)
HCT VFR BLD AUTO: 40 % (ref 40–53)
HGB BLD-MCNC: 14.4 G/DL (ref 13.3–17.7)
HOLD SPECIMEN: NORMAL
IMM GRANULOCYTES # BLD: 0 10E3/UL
IMM GRANULOCYTES NFR BLD: 0 %
INTERPRETATION ECG - MUSE: NORMAL
LYMPHOCYTES # BLD AUTO: 2.7 10E3/UL (ref 0.8–5.3)
LYMPHOCYTES NFR BLD AUTO: 42 %
MCH RBC QN AUTO: 31.8 PG (ref 26.5–33)
MCHC RBC AUTO-ENTMCNC: 36 G/DL (ref 31.5–36.5)
MCV RBC AUTO: 88 FL (ref 78–100)
MONOCYTES # BLD AUTO: 0.4 10E3/UL (ref 0–1.3)
MONOCYTES NFR BLD AUTO: 7 %
NEUTROPHILS # BLD AUTO: 3.2 10E3/UL (ref 1.6–8.3)
NEUTROPHILS NFR BLD AUTO: 48 %
NRBC # BLD AUTO: 0 10E3/UL
NRBC BLD AUTO-RTO: 0 /100
P AXIS - MUSE: 65 DEGREES
PLATELET # BLD AUTO: 279 10E3/UL (ref 150–450)
POTASSIUM SERPL-SCNC: 3.3 MMOL/L (ref 3.4–5.3)
PR INTERVAL - MUSE: 130 MS
QRS DURATION - MUSE: 92 MS
QT - MUSE: 382 MS
QTC - MUSE: 474 MS
R AXIS - MUSE: 77 DEGREES
RBC # BLD AUTO: 4.53 10E6/UL (ref 4.4–5.9)
SODIUM SERPL-SCNC: 134 MMOL/L (ref 135–145)
SYSTOLIC BLOOD PRESSURE - MUSE: NORMAL MMHG
T AXIS - MUSE: 61 DEGREES
TROPONIN T SERPL HS-MCNC: 8 NG/L
VENTRICULAR RATE- MUSE: 93 BPM
WBC # BLD AUTO: 6.5 10E3/UL (ref 4–11)

## 2024-05-06 PROCEDURE — 99285 EMERGENCY DEPT VISIT HI MDM: CPT

## 2024-05-06 PROCEDURE — 84484 ASSAY OF TROPONIN QUANT: CPT | Performed by: EMERGENCY MEDICINE

## 2024-05-06 PROCEDURE — 36415 COLL VENOUS BLD VENIPUNCTURE: CPT | Performed by: EMERGENCY MEDICINE

## 2024-05-06 PROCEDURE — 93005 ELECTROCARDIOGRAM TRACING: CPT

## 2024-05-06 PROCEDURE — 85025 COMPLETE CBC W/AUTO DIFF WBC: CPT | Performed by: EMERGENCY MEDICINE

## 2024-05-06 PROCEDURE — 80048 BASIC METABOLIC PNL TOTAL CA: CPT | Performed by: EMERGENCY MEDICINE

## 2024-05-06 ASSESSMENT — ACTIVITIES OF DAILY LIVING (ADL): ADLS_ACUITY_SCORE: 35

## 2024-05-06 NOTE — DISCHARGE INSTRUCTIONS

## 2024-05-06 NOTE — ED NOTES
Bed: ED20  Expected date:   Expected time:   Means of arrival:   Comments:  Med fire 39M CP, etoh eta [id number]
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
## 2024-05-06 NOTE — ED PROVIDER NOTES
Emergency Department Note      History of Present Illness     Chief Complaint  Chest Pain    HPI  Olivia Simmons is a 39 year old male who presents to the emergency department from the [facility] with a complaint of chest pain.  He states that he no longer has chest pain and that his chest hurts when he gets angry and he was angry with the .  EMS reported that he did request transport and he specifically wanted to come to [place].  He states he does not want any further evaluation of his chest pain.  He states that he has been drinking alcohol the night and he drinks alcohol regularly.  He does not want further treatment for this.  He states that he does not want any evaluation at all and would like to go.    Independent Historian  EMS as detailed above.    Review of External Notes  Several outside emergency department visits for alcohol abuse and homelessness reviewed.    Past Medical History   Medical History and Problem List  Drug use.  Alcohol abuse.  Homelessness.    Surgical History   History reviewed. No pertinent surgical history.  Physical Exam   Patient Vitals for the past 24 hrs:   BP Pulse Resp SpO2   05/06/24 0345 -- 96 18 100 %   05/06/24 0330 (!) 140/77 98 (!) 62 100 %     Physical Exam  General: Alert, No distress.  Somewhat agitated, per review of records this is not unusual for the patient.  Head: No signs of trauma.   Mouth/Throat: Oropharynx moist.   Eyes: Conjunctivae are normal. Pupils are equal..   Neck: Normal range of motion.    CV: Appears well perfused.  Resp:No respiratory distress.   MSK: Normal range of motion. No obvious deformity.   Neuro: The patient is alert and interactive. Speech clear. GCS 15  Skin: No lesion or sign of trauma noted.   Psych: Labile mood.  States that he is angry.    Diagnostics   Lab Results   Labs Ordered and Resulted from Time of ED Arrival to Time of ED Departure   BASIC METABOLIC PANEL - Abnormal       Result Value    Sodium 134 (*)     Potassium  3.3 (*)     Chloride 97 (*)     Carbon Dioxide (CO2) 23      Anion Gap 14      Urea Nitrogen 13.5      Creatinine 0.83      GFR Estimate >90      Calcium 8.8      Glucose 94     TROPONIN T, HIGH SENSITIVITY - Normal    Troponin T, High Sensitivity 8     CBC WITH PLATELETS AND DIFFERENTIAL    WBC Count 6.5      RBC Count 4.53      Hemoglobin 14.4      Hematocrit 40.0      MCV 88      MCH 31.8      MCHC 36.0      RDW 12.8      Platelet Count 279      % Neutrophils 48      % Lymphocytes 42      % Monocytes 7      % Eosinophils 2      % Basophils 1      % Immature Granulocytes 0      NRBCs per 100 WBC 0      Absolute Neutrophils 3.2      Absolute Lymphocytes 2.7      Absolute Monocytes 0.4      Absolute Eosinophils 0.1      Absolute Basophils 0.0      Absolute Immature Granulocytes 0.0      Absolute NRBCs 0.0         EKG   ECG taken at 0326  Normal sinus rhythm.  Of note, the morphology is slightly different than his prior EKG no ST elevation or depression present.    Rate 93 bpm. MA interval 130 ms. QRS duration 92 ms. QT/QTc 382/474 ms.       ED Course      Social Determinants of Health adding to complexity of care  Healthcare Access/Compliance, Homelessness/Housing Insecurity, and Stress/Adjustment Disorders    ED Course  Past medical records, nursing notes, and vitals reviewed.  I performed an exam of the patient and obtained history, as documented above.   Patient became insistent on leaving and threatening to staff.  He is not currently holdable, is walking with a steady gait and per review of records seems to be behaving at his baseline.  Medical Decision Making / Diagnosis     LUISA  Olivia Simmons is a 39 year old male who presents to the emergency department initially complaining of chest pain.  He states he now has no pain.  His initial troponin was 8.  He adamantly refuses any further testing.  He insist on immediate discharge.  When asked to stay he became agitated and as he is not an immediate risk to  himself or others was discharged per his request.  I did inform him that I would typically recommend further cardiac evaluation, however, he is unwilling to undergo this.    Disposition  The patient was discharged.     ICD-10 Codes:    ICD-10-CM    1. Alcoholic intoxication with complication (H24)  F10.929            Discharge Medications  Discharge Medication List as of 5/6/2024  4:27 AM            Ellen Damon MD    [employer]          Ellen Chan MD  05/06/24 0857

## 2024-05-11 ENCOUNTER — HOSPITAL ENCOUNTER (EMERGENCY)
Facility: CLINIC | Age: 39
Discharge: HOME | End: 2024-05-11
Attending: EMERGENCY MEDICINE | Admitting: EMERGENCY MEDICINE
Payer: COMMERCIAL

## 2024-05-11 VITALS
DIASTOLIC BLOOD PRESSURE: 62 MMHG | HEART RATE: 101 BPM | TEMPERATURE: 97.6 F | RESPIRATION RATE: 15 BRPM | OXYGEN SATURATION: 100 % | SYSTOLIC BLOOD PRESSURE: 115 MMHG

## 2024-05-11 DIAGNOSIS — F10.920 ALCOHOLIC INTOXICATION WITHOUT COMPLICATION (H): ICD-10-CM

## 2024-05-11 LAB — ALCOHOL BREATH TEST: 0.17 (ref 0–0.01)

## 2024-05-11 PROCEDURE — 99283 EMERGENCY DEPT VISIT LOW MDM: CPT | Performed by: EMERGENCY MEDICINE

## 2024-05-11 ASSESSMENT — COLUMBIA-SUICIDE SEVERITY RATING SCALE - C-SSRS: IS THE PATIENT NOT ABLE TO COMPLETE C-SSRS: UNABLE TO VERBALIZE

## 2024-05-11 ASSESSMENT — ACTIVITIES OF DAILY LIVING (ADL)
ADLS_ACUITY_SCORE: 35

## 2024-05-11 NOTE — ED TRIAGE NOTES
Pt was picked up by EMS outside of a detox center. Pt called EMS because detox was full. Chief complaint is ETOH intoxication, pt is stating he currently wants detox.    Pt asked EMS for a kissed and leaned in, was not aggressive with EMS when he says no. Blood glucose en route was 116.
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
## 2024-05-11 NOTE — ED NOTES
Bed: ED09  Expected date:   Expected time:   Means of arrival:   Comments:  Hold  HEMS 429 40 y/o M ETOH

## 2025-04-03 ENCOUNTER — HOSPITAL ENCOUNTER (EMERGENCY)
Facility: CLINIC | Age: 40
Discharge: HOME | End: 2025-04-03
Attending: STUDENT IN AN ORGANIZED HEALTH CARE EDUCATION/TRAINING PROGRAM
Payer: COMMERCIAL

## 2025-04-03 VITALS
DIASTOLIC BLOOD PRESSURE: 63 MMHG | OXYGEN SATURATION: 99 % | TEMPERATURE: 97.8 F | SYSTOLIC BLOOD PRESSURE: 122 MMHG | HEART RATE: 111 BPM | RESPIRATION RATE: 16 BRPM

## 2025-04-03 DIAGNOSIS — R46.89 ABNORMAL BEHAVIOR: ICD-10-CM

## 2025-04-03 DIAGNOSIS — R45.1 AGITATION REQUIRING SEDATION PROTOCOL: ICD-10-CM

## 2025-04-03 DIAGNOSIS — F10.929 ALCOHOLIC INTOXICATION WITH COMPLICATION: ICD-10-CM

## 2025-04-03 PROCEDURE — 96372 THER/PROPH/DIAG INJ SC/IM: CPT | Performed by: STUDENT IN AN ORGANIZED HEALTH CARE EDUCATION/TRAINING PROGRAM

## 2025-04-03 PROCEDURE — 250N000011 HC RX IP 250 OP 636: Performed by: EMERGENCY MEDICINE

## 2025-04-03 PROCEDURE — 99285 EMERGENCY DEPT VISIT HI MDM: CPT | Mod: 25 | Performed by: STUDENT IN AN ORGANIZED HEALTH CARE EDUCATION/TRAINING PROGRAM

## 2025-04-03 PROCEDURE — 250N000011 HC RX IP 250 OP 636: Mod: JZ | Performed by: STUDENT IN AN ORGANIZED HEALTH CARE EDUCATION/TRAINING PROGRAM

## 2025-04-03 PROCEDURE — 99284 EMERGENCY DEPT VISIT MOD MDM: CPT | Performed by: STUDENT IN AN ORGANIZED HEALTH CARE EDUCATION/TRAINING PROGRAM

## 2025-04-03 RX ORDER — OLANZAPINE 10 MG/2ML
10 INJECTION, POWDER, FOR SOLUTION INTRAMUSCULAR ONCE
Status: COMPLETED | OUTPATIENT
Start: 2025-04-03 | End: 2025-04-03

## 2025-04-03 RX ORDER — ONDANSETRON 4 MG/1
4 TABLET, ORALLY DISINTEGRATING ORAL ONCE
Status: COMPLETED | OUTPATIENT
Start: 2025-04-03 | End: 2025-04-03

## 2025-04-03 RX ADMIN — OLANZAPINE 10 MG: 10 INJECTION, POWDER, FOR SOLUTION INTRAMUSCULAR at 00:46

## 2025-04-03 RX ADMIN — ONDANSETRON 4 MG: 4 TABLET, ORALLY DISINTEGRATING ORAL at 03:35

## 2025-04-03 ASSESSMENT — ACTIVITIES OF DAILY LIVING (ADL)
ADLS_ACUITY_SCORE: 41

## 2025-04-03 NOTE — ED NOTES
Zofran was pulled for the patient as he was crawling out of bed and was reporting feeling nauseous. Patient did take it and went back to sleep.
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
## 2025-04-03 NOTE — ED PROVIDER NOTES
ED Provider Note  [facility]      History     Chief Complaint   Patient presents with    Alcohol Intoxication     BIBA patient appears intoxicated and agitated, brought in handcuffed.      HPI  Errolal SYD Simmons is a 40 year old male with a history of substance abuse who presents to the emergency department for alcohol intoxication.  Chart review shows that patient has a history of numerous emergency department visits for intoxication.  He was brought in via ambulance [employer].  Apparently was being belligerent on a bus.  Police attempted to talk him into leaving the bus and did not plan to bring him in however he made a comment about having a gun so they took him into custody.  Due to his level of suspected intoxication he was brought to a Medical Center for evaluation.  Patient apparently requested to come specifically to this hospital.  No trauma was reported prehospital.  History from patient is limited due to altered mental status.    Past Medical History  No past medical history on file.  No past surgical history on file.  acetaminophen (TYLENOL) 500 MG tablet  order for DME  order for DME      No Known Allergies  Family History  No family history on file.  Social History   Social History     Tobacco Use    Smoking status: Every Day    Smokeless tobacco: Never   Substance Use Topics    Alcohol use: Yes     Comment: 2 pints a day    Drug use: Yes     Types: Marijuana      A complete review of systems was attempted but limited due to altered mental status.    Physical Exam   BP: 127/86  Pulse: 102  Resp: 20  SpO2: 96 %  Physical Exam  Vital Signs Reviewed  Gen: Well nourished, well developed, erratic behavior, going from resting comfortably to posturing and threatening  HEENT: NC/AT, PERRL, EOMI, MMM  Neck: Supple, FROM  CV: Regular Rate  Lungs/Chest: Normal Effort  Abd: Non-distended  MSK/Back: FROM, no visible deformity  Neuro: A&Ox3, GCS 15, CN II-XII unremarkable.  Strength and sensation globally intact.  Skin: Warm, Dry, Intact, no visible lesions     ED Course, Procedures, & Data      Procedures                No results found for any visits on 04/03/25.  Medications   OLANZapine (zyPREXA) injection 10 mg (10 mg Intramuscular $Given 4/3/25 0046)     Labs Ordered and Resulted from Time of ED Arrival to Time of ED Departure - No data to display  No orders to display          Critical care was not performed.     Medical Decision Making  The patient's presentation was of high complexity (an acute health issue posing potential threat to life or bodily function).    The patient's evaluation involved:  an assessment requiring an independent historian ([employer])  review of external note(s) from 2 sources ([employer], [employer]    The patient's management necessitated high risk (restraints and/or parenteral medication for agitation).    Assessment & Plan    Olivia Simmons is a 40 year old male with a history of substance abuse who presents to the emergency department for alcohol intoxication.  On arrival patient has mild tachycardia other vital signs reassuring.  His behavior is erratic.  He was initially able to be brought into a room but then became agitated requiring a behavioral health code.  Patient received 10 mg of intramuscular olanzapine for severe agitation presenting a threat to himself and others.  While this medication was kicking in there was 1 episode where he became upset at not being able to eat and attempted to swing at security.  He was escorted back to the bed where he accepted a blanket and pillows and went to bed.    There is no significant external evidence of trauma on examination.  Suspect his altered sensorium is related to acute alcohol intoxication given prehospital report and medical history.    There is no prehospital report of the patient seeking detox nor is there report of any suicidal ideations.    Patient signed out to Dr. Miner  to continue to monitor patient for sobriety and reassess to determine disposition.      New Prescriptions    No medications on file       Final diagnoses:   Alcoholic intoxication with complication   Abnormal behavior   Agitation requiring sedation protocol     Austin Verduzco Jr., MD   [facility] EMERGENCY DEPARTMENT  4/3/2025     Austin Verduzco MD  04/03/25 0240

## 2025-04-03 NOTE — ED NOTES
Code 21 was called again, patient got up and was starting to get combative with security and the 1:1. Provider ordered IVANNA. Staff came to the code but patient had been redirected back into bed, medication starting to take affect and patient is able to lay down.

## 2025-04-03 NOTE — ED PROVIDER NOTES
Emergency Department I-PASS Sign-out      Illness Severity: Stable    Patient Summary:  40 year old male with pertinent PMH of alcohol use disorder who presented with abnormal and agitated behavior [place] transit.  Brought in by EMS and police.  Was posturing and attempted to swing at security so received 10 mg of IM Zyprexa    ED Course/treatment plan:   -Metabolize to freedom  -Reassess once sober    Clinical Impression:  (F10.929) Alcoholic intoxication with complication    (R46.89) Abnormal behavior    (R45.1) Agitation requiring sedation protocol      Edited by: Austin Verduzco MD at 4/3/2025 0238    Action List:  -To do:  Reassess mental status and clinical sobriety    Situational Awareness & Contingency Planning:  Code Status (Most recent):  No Order    Disposition:  likely to be discharged    Edited by: Austin Verduzco MD at 4/3/2025 0238    Synthesis & Events after sign-out:  /63   Pulse 111   Temp 97.8  F (36.6  C)   Resp 16   SpO2 99%     EXAM:  HEENT: Normal.  Oropharynx clear and moist.  Neck: Supple, trachea midline, normal voice  Chest:  No respiratory distress, speaks in complete sentences, chest wall nontender, lungs clear in all fields  CV: Regular rate and rhythm, no murmur, normal pulse, no jugular venous distention  Abdomen: Nondistended, soft nontender.  No hepatosplenomegaly.  Extremities: No edema or tenderness    The patient is sober now clinically.  No signs of significant withdrawal but would like to go to a community detox facility.  He was interested in a facility in [facility].  We contacted that facility and the patient was accepted and will be transported there via cab.        Sony Reddy MD   Emergency Medicine     Sony Reddy MD  04/03/25 0224

## 2025-08-02 ENCOUNTER — HOSPITAL ENCOUNTER (EMERGENCY)
Facility: CLINIC | Age: 40
Discharge: HOME | End: 2025-08-02
Attending: EMERGENCY MEDICINE | Admitting: EMERGENCY MEDICINE
Payer: COMMERCIAL

## 2025-08-02 VITALS
SYSTOLIC BLOOD PRESSURE: 107 MMHG | TEMPERATURE: 98.4 F | OXYGEN SATURATION: 98 % | HEART RATE: 75 BPM | DIASTOLIC BLOOD PRESSURE: 87 MMHG | RESPIRATION RATE: 18 BRPM

## 2025-08-02 DIAGNOSIS — R55 SYNCOPE, UNSPECIFIED SYNCOPE TYPE: Primary | ICD-10-CM

## 2025-08-02 DIAGNOSIS — E86.0 DEHYDRATION: ICD-10-CM

## 2025-08-02 LAB
ALBUMIN SERPL BCG-MCNC: 3 G/DL (ref 3.5–5.2)
ALP SERPL-CCNC: 38 U/L (ref 40–150)
ALT SERPL W P-5'-P-CCNC: 16 U/L (ref 0–70)
ANION GAP SERPL CALCULATED.3IONS-SCNC: 7 MMOL/L (ref 7–15)
AST SERPL W P-5'-P-CCNC: 14 U/L (ref 0–45)
ATRIAL RATE - MUSE: 83 BPM
BILIRUB SERPL-MCNC: 0.5 MG/DL
BUN SERPL-MCNC: 8 MG/DL (ref 6–20)
CALCIUM SERPL-MCNC: 7.8 MG/DL (ref 8.8–10.4)
CHLORIDE SERPL-SCNC: 104 MMOL/L (ref 98–107)
CREAT SERPL-MCNC: 1.13 MG/DL (ref 0.67–1.17)
DIASTOLIC BLOOD PRESSURE - MUSE: NORMAL MMHG
EGFRCR SERPLBLD CKD-EPI 2021: 85 ML/MIN/1.73M2
ERYTHROCYTE [DISTWIDTH] IN BLOOD BY AUTOMATED COUNT: 12.4 % (ref 10–15)
ETHANOL SERPL-MCNC: <0.01 G/DL
GLUCOSE SERPL-MCNC: 159 MG/DL (ref 70–99)
HCO3 SERPL-SCNC: 24 MMOL/L (ref 22–29)
HCT VFR BLD AUTO: 40.6 % (ref 40–53)
HGB BLD-MCNC: 14.7 G/DL (ref 13.3–17.7)
HOLD SPECIMEN: NORMAL
INTERPRETATION ECG - MUSE: NORMAL
MCH RBC QN AUTO: 30.8 PG (ref 26.5–33)
MCHC RBC AUTO-ENTMCNC: 36.2 G/DL (ref 31.5–36.5)
MCV RBC AUTO: 85 FL (ref 78–100)
P AXIS - MUSE: 42 DEGREES
PLATELET # BLD AUTO: 224 10E3/UL (ref 150–450)
POTASSIUM SERPL-SCNC: 3.6 MMOL/L (ref 3.4–5.3)
PR INTERVAL - MUSE: 132 MS
PROT SERPL-MCNC: 4.3 G/DL (ref 6.4–8.3)
QRS DURATION - MUSE: 76 MS
QT - MUSE: 372 MS
QTC - MUSE: 437 MS
R AXIS - MUSE: 60 DEGREES
RBC # BLD AUTO: 4.77 10E6/UL (ref 4.4–5.9)
SODIUM SERPL-SCNC: 135 MMOL/L (ref 135–145)
SYSTOLIC BLOOD PRESSURE - MUSE: NORMAL MMHG
T AXIS - MUSE: 39 DEGREES
TROPONIN T SERPL HS-MCNC: <6 NG/L
VENTRICULAR RATE- MUSE: 83 BPM
WBC # BLD AUTO: 5.9 10E3/UL (ref 4–11)

## 2025-08-02 PROCEDURE — 258N000003 HC RX IP 258 OP 636: Performed by: EMERGENCY MEDICINE

## 2025-08-02 PROCEDURE — 93005 ELECTROCARDIOGRAM TRACING: CPT

## 2025-08-02 PROCEDURE — 85027 COMPLETE CBC AUTOMATED: CPT | Performed by: EMERGENCY MEDICINE

## 2025-08-02 PROCEDURE — 84484 ASSAY OF TROPONIN QUANT: CPT | Performed by: EMERGENCY MEDICINE

## 2025-08-02 PROCEDURE — 82077 ASSAY SPEC XCP UR&BREATH IA: CPT | Performed by: EMERGENCY MEDICINE

## 2025-08-02 PROCEDURE — 99284 EMERGENCY DEPT VISIT MOD MDM: CPT | Mod: 25 | Performed by: EMERGENCY MEDICINE

## 2025-08-02 PROCEDURE — 96360 HYDRATION IV INFUSION INIT: CPT

## 2025-08-02 PROCEDURE — 82040 ASSAY OF SERUM ALBUMIN: CPT | Performed by: EMERGENCY MEDICINE

## 2025-08-02 PROCEDURE — 80053 COMPREHEN METABOLIC PANEL: CPT | Performed by: EMERGENCY MEDICINE

## 2025-08-02 PROCEDURE — 96361 HYDRATE IV INFUSION ADD-ON: CPT

## 2025-08-02 PROCEDURE — 250N000013 HC RX MED GY IP 250 OP 250 PS 637: Performed by: EMERGENCY MEDICINE

## 2025-08-02 PROCEDURE — 36415 COLL VENOUS BLD VENIPUNCTURE: CPT | Performed by: EMERGENCY MEDICINE

## 2025-08-02 RX ORDER — BUPROPION HYDROCHLORIDE 300 MG/1
1 TABLET ORAL DAILY
COMMUNITY
Start: 2025-07-21

## 2025-08-02 RX ORDER — TRAZODONE HYDROCHLORIDE 100 MG/1
TABLET ORAL
COMMUNITY
Start: 2025-07-07

## 2025-08-02 RX ORDER — IBUPROFEN 400 MG/1
400 TABLET, FILM COATED ORAL ONCE
Status: COMPLETED | OUTPATIENT
Start: 2025-08-02 | End: 2025-08-02

## 2025-08-02 RX ADMIN — IBUPROFEN 400 MG: 400 TABLET ORAL at 19:52

## 2025-08-02 RX ADMIN — SODIUM CHLORIDE 1000 ML: 0.9 INJECTION, SOLUTION INTRAVENOUS at 18:40

## 2025-08-02 RX ADMIN — SODIUM CHLORIDE, SODIUM LACTATE, POTASSIUM CHLORIDE, AND CALCIUM CHLORIDE 1000 ML: .6; .31; .03; .02 INJECTION, SOLUTION INTRAVENOUS at 19:52

## 2025-08-02 ASSESSMENT — COLUMBIA-SUICIDE SEVERITY RATING SCALE - C-SSRS
2. HAVE YOU ACTUALLY HAD ANY THOUGHTS OF KILLING YOURSELF IN THE PAST MONTH?: NO
1. IN THE PAST MONTH, HAVE YOU WISHED YOU WERE DEAD OR WISHED YOU COULD GO TO SLEEP AND NOT WAKE UP?: NO
6. HAVE YOU EVER DONE ANYTHING, STARTED TO DO ANYTHING, OR PREPARED TO DO ANYTHING TO END YOUR LIFE?: NO

## 2025-08-02 ASSESSMENT — ACTIVITIES OF DAILY LIVING (ADL)
ADLS_ACUITY_SCORE: 41